# Patient Record
Sex: FEMALE | Race: BLACK OR AFRICAN AMERICAN | NOT HISPANIC OR LATINO
[De-identification: names, ages, dates, MRNs, and addresses within clinical notes are randomized per-mention and may not be internally consistent; named-entity substitution may affect disease eponyms.]

---

## 2019-10-01 ENCOUNTER — APPOINTMENT (OUTPATIENT)
Dept: VASCULAR SURGERY | Facility: CLINIC | Age: 54
End: 2019-10-01

## 2019-10-08 ENCOUNTER — APPOINTMENT (OUTPATIENT)
Dept: VASCULAR SURGERY | Facility: CLINIC | Age: 54
End: 2019-10-08

## 2021-04-05 ENCOUNTER — TRANSCRIPTION ENCOUNTER (OUTPATIENT)
Age: 56
End: 2021-04-05

## 2021-04-06 ENCOUNTER — TRANSCRIPTION ENCOUNTER (OUTPATIENT)
Age: 56
End: 2021-04-06

## 2021-12-08 ENCOUNTER — EMERGENCY (EMERGENCY)
Facility: HOSPITAL | Age: 56
LOS: 1 days | Discharge: HOME | End: 2021-12-08
Attending: EMERGENCY MEDICINE
Payer: COMMERCIAL

## 2021-12-08 VITALS
DIASTOLIC BLOOD PRESSURE: 90 MMHG | WEIGHT: 255.07 LBS | RESPIRATION RATE: 18 BRPM | OXYGEN SATURATION: 97 % | SYSTOLIC BLOOD PRESSURE: 140 MMHG | TEMPERATURE: 98 F | HEIGHT: 66 IN | HEART RATE: 69 BPM

## 2021-12-08 DIAGNOSIS — Z98.89 OTHER SPECIFIED POSTPROCEDURAL STATES: Chronic | ICD-10-CM

## 2021-12-08 DIAGNOSIS — L73.2 HIDRADENITIS SUPPURATIVA: Chronic | ICD-10-CM

## 2021-12-08 DIAGNOSIS — M22.90 UNSPECIFIED DISORDER OF PATELLA, UNSPECIFIED KNEE: Chronic | ICD-10-CM

## 2021-12-08 DIAGNOSIS — Z98.84 BARIATRIC SURGERY STATUS: Chronic | ICD-10-CM

## 2021-12-08 LAB
ALBUMIN SERPL ELPH-MCNC: 4.6 G/DL — SIGNIFICANT CHANGE UP (ref 3.3–5)
ALP SERPL-CCNC: 80 U/L — SIGNIFICANT CHANGE UP (ref 40–120)
ALT FLD-CCNC: 20 U/L — SIGNIFICANT CHANGE UP (ref 10–45)
ANION GAP SERPL CALC-SCNC: 13 MMOL/L — SIGNIFICANT CHANGE UP (ref 5–17)
AST SERPL-CCNC: 20 U/L — SIGNIFICANT CHANGE UP (ref 10–40)
BASOPHILS # BLD AUTO: 0.02 K/UL — SIGNIFICANT CHANGE UP (ref 0–0.2)
BASOPHILS NFR BLD AUTO: 0.2 % — SIGNIFICANT CHANGE UP (ref 0–2)
BILIRUB SERPL-MCNC: 0.2 MG/DL — SIGNIFICANT CHANGE UP (ref 0.2–1.2)
BUN SERPL-MCNC: 17 MG/DL — SIGNIFICANT CHANGE UP (ref 7–23)
CALCIUM SERPL-MCNC: 9.8 MG/DL — SIGNIFICANT CHANGE UP (ref 8.4–10.5)
CHLORIDE SERPL-SCNC: 104 MMOL/L — SIGNIFICANT CHANGE UP (ref 96–108)
CO2 SERPL-SCNC: 24 MMOL/L — SIGNIFICANT CHANGE UP (ref 22–31)
CREAT SERPL-MCNC: 1.04 MG/DL — SIGNIFICANT CHANGE UP (ref 0.5–1.3)
EOSINOPHIL # BLD AUTO: 0.02 K/UL — SIGNIFICANT CHANGE UP (ref 0–0.5)
EOSINOPHIL NFR BLD AUTO: 0.2 % — SIGNIFICANT CHANGE UP (ref 0–6)
GLUCOSE SERPL-MCNC: 130 MG/DL — HIGH (ref 70–99)
HCT VFR BLD CALC: 38.7 % — SIGNIFICANT CHANGE UP (ref 34.5–45)
HGB BLD-MCNC: 12 G/DL — SIGNIFICANT CHANGE UP (ref 11.5–15.5)
IMM GRANULOCYTES NFR BLD AUTO: 0.3 % — SIGNIFICANT CHANGE UP (ref 0–1.5)
LYMPHOCYTES # BLD AUTO: 1.44 K/UL — SIGNIFICANT CHANGE UP (ref 1–3.3)
LYMPHOCYTES # BLD AUTO: 12.5 % — LOW (ref 13–44)
MCHC RBC-ENTMCNC: 25.4 PG — LOW (ref 27–34)
MCHC RBC-ENTMCNC: 31 GM/DL — LOW (ref 32–36)
MCV RBC AUTO: 82 FL — SIGNIFICANT CHANGE UP (ref 80–100)
MONOCYTES # BLD AUTO: 0.46 K/UL — SIGNIFICANT CHANGE UP (ref 0–0.9)
MONOCYTES NFR BLD AUTO: 4 % — SIGNIFICANT CHANGE UP (ref 2–14)
NEUTROPHILS # BLD AUTO: 9.53 K/UL — HIGH (ref 1.8–7.4)
NEUTROPHILS NFR BLD AUTO: 82.8 % — HIGH (ref 43–77)
NRBC # BLD: 0 /100 WBCS — SIGNIFICANT CHANGE UP (ref 0–0)
PLATELET # BLD AUTO: 290 K/UL — SIGNIFICANT CHANGE UP (ref 150–400)
POTASSIUM SERPL-MCNC: 3.5 MMOL/L — SIGNIFICANT CHANGE UP (ref 3.5–5.3)
POTASSIUM SERPL-SCNC: 3.5 MMOL/L — SIGNIFICANT CHANGE UP (ref 3.5–5.3)
PROT SERPL-MCNC: 8.4 G/DL — HIGH (ref 6–8.3)
RBC # BLD: 4.72 M/UL — SIGNIFICANT CHANGE UP (ref 3.8–5.2)
RBC # FLD: 16.7 % — HIGH (ref 10.3–14.5)
SODIUM SERPL-SCNC: 141 MMOL/L — SIGNIFICANT CHANGE UP (ref 135–145)
TROPONIN T, HIGH SENSITIVITY RESULT: 8 NG/L — SIGNIFICANT CHANGE UP (ref 0–51)
WBC # BLD: 11.51 K/UL — HIGH (ref 3.8–10.5)
WBC # FLD AUTO: 11.51 K/UL — HIGH (ref 3.8–10.5)

## 2021-12-08 NOTE — ED PROVIDER NOTE - PATIENT PORTAL LINK FT
You can access the FollowMyHealth Patient Portal offered by [facility] by registering at the following website: [email or web address]. By joining [facility]’s FollowMyHealth portal, you will also be able to view your health information using other applications (apps) compatible with our system.

## 2021-12-08 NOTE — ED PROVIDER NOTE - OBJECTIVE STATEMENT
55yo F h.o HTN/HLD, Afib (xeralto, metoprolol) presenting to ED bc of syncope. Pt reports yesterday afternoon, was visiting family at doctors office, seated when she began to feel dizzy with palpations and flushing. Pt reports she "blacked out" for a few seconds and after waking up continued to feel dizzy with palpitations. Palpitations resovled after several hours, initially presented to [facility] however left bc dissatisfied with care. At [facility] EKG demonstrated normal sinus rhythm. Denies any chest pain or SOB, no smoking/drinking/tox ingestion. Denies LE swelling or long periods of stasis. ROS otherwise negative. Last saw PCP this morning prior to symptoms, has been relatively hypotensive and was given reduced script of amlodipine.

## 2021-12-08 NOTE — ED PROVIDER NOTE - PHYSICAL EXAMINATION
GENERAL: non-toxic appearing, alert, in NAD  HEENT: atraumatic, normocephalic, Vision grossly intact, no conjunctivitis or discharge, hearing grossly intact,  no nasal discharge, epistaxis   CARDIAC: RRR, normal S1S2,  no appreciable murmurs, no cyanosis, cap refill < 2 seconds  PULM: normal work of breathing, oxygen saturation on RA wnl, CTAB, no crackles, rales, rhonchi, or wheezing  GI: abdomen nondistended, soft, nontender, no guarding or rebound tenderness, no palpable masses  NEURO: awake and alert, follows commands, normal speech, PERRLA, EOMI, no focal motor or sensory deficits  MSK: spine appears normal, no joint swelling or erythema, ranging all extremities with no appreciable loss of ROM  EXT: no peripheral edema, calf tenderness, redness or swelling  SKIN: warm, dry, and intact, no rashes  PSYCH: appropriate mood and affect

## 2021-12-08 NOTE — ED PROVIDER NOTE - NSFOLLOWUPINSTRUCTIONS_ED_ALL_ED_FT
You were seen and evaluated today for palpitations likely caused by caused by _____. Our work-up today included ____, the results of which have been explained to you and provided to you separately. Please keep a copy of these results to present to doctor in future visits.     - Follow up with your primary care physician within 7-10 days  - You were prescribed ____. Please take ___ as directed on bottle.  - Be sure to take all of your normal medications as prescribed  - Expect a call within the next 2-3 business days to schedule an appointment with ___  - If you do not recieve a call, please call the number provided to you in this packet to schedule an appointment within one week from today    Please return to the Emergency Department should you experience any of the following:  - New or worsening chest pain  - Shortness of breath, Palpitations  - New weakness, fatigue, or fainting  - Fever, unexplained weightloss, night sweats  - Blood in stool or in urine You were seen and evaluated today for palpitations. Our work-up today included blood work, urine studies, and EKG, the results of which have been explained to you and provided to you separately. Please keep a copy of these results to present to doctor in future visits.     - Follow up with your primary care physician within 7-10 days  - Be sure to take all of your normal medications as prescribed  - Follow up with your cardiologist as discussed, ask about a halter monitor to evaluate for paroxysmal atrial fibrillation    Please return to the Emergency Department should you experience any of the following:  - New or worsening chest pain  - Shortness of breath, Palpitations  - New weakness, fatigue, or fainting  - Fever, unexplained weight loss, night sweats  - Blood in stool or in urine

## 2021-12-08 NOTE — ED PROVIDER NOTE - PROGRESS NOTE DETAILS
Mik Barr, PGY-2: Pt reexamined at bedside, resting comfortably, vitals stable. No events on tele monitoring. Lytes wnl, UA negative.

## 2021-12-08 NOTE — ED PROVIDER NOTE - ATTENDING CONTRIBUTION TO CARE
Pt w hx parox afib, here with palpitations and questionable syncopal episode (for a few seconds). pt initially asymptomatic, while waiting in ED began feeling palpitations again. very well appearing on exam, irreg rhythm w normal rate, well perfused, normal breathing, no peripheral edema. Workup done to rule out acute infectious, metabolic, cardiac condition - unremarkable for acute process. ekg shows NSR with intermittent PVCs. Multiple diagnoses were considered during patient's evaluation today. While exact etiology of symptoms is unclear, there appears to be no emergent process today that would require further emergent or inpatient management. Pt is safe for dc with outpt f/u w cards for further eval and possible holter and return instructions if symptoms worsen.

## 2021-12-08 NOTE — ED PROVIDER NOTE - CLINICAL SUMMARY MEDICAL DECISION MAKING FREE TEXT BOX
57yo F h.o afib with syncope and palpitations, currently in sinus with no lasting symptoms, vitals stable, no murmur, lower extremity swelling. Clear lungs. Concern for paroxysmal afib vs vasovagal. Do not suspect ACS given non-ischemic EKG, no chest pain. Will get labs, tele monitor and reassess.

## 2021-12-08 NOTE — ED PROVIDER NOTE - RAPID ASSESSMENT
56y F with pmhx of Afib on Xarelto p/w palpitations, dizziness, and diaphoresis at 4pm. Pt states Sx began while she was waiting for sister at doctors appointment. EMS called and pt found to be in Afib and brought to [facility]. However, there was a 12hr wait to be seen so pt left to come here. Cards Dr. Holley and Aidan.     Patient was seen as a tele QDOC patient. The patient will be seen and further worked up in the main emergency department and their care will be completed by the main emergency department team along with a thorough physical exam. Receiving team will follow up on labs, analgesia, any clinical imaging, reassess and disposition as clinically indicated, all decisions regarding the progression of care will be made at their discretion.    Scribe Statement: I, Aileen Ni, attest that this documentation has been prepared under the direction and in the presence of Brenda Harp) 56y F with pmhx of Afib on Xarelto p/w palpitations, dizziness, and diaphoresis at 4pm. Pt states Sx began while she was waiting for sister at doctors appointment. EMS called and pt found to be in Afib and brought to [facility]. However, there was a 12hr wait to be seen so pt left to come here. States palpitations have mostly resolved but continues to feel off. Cards Dr. Holley and Aidan.     Patient was seen as a tele QDOC patient. The patient will be seen and further worked up in the main emergency department and their care will be completed by the main emergency department team along with a thorough physical exam. Receiving team will follow up on labs, analgesia, any clinical imaging, reassess and disposition as clinically indicated, all decisions regarding the progression of care will be made at their discretion.    Scribe Statement: Aileen GARCIAS, attest that this documentation has been prepared under the direction and in the presence of Brenda Harp) 56y F with pmhx of Afib on Xarelto p/w palpitations, dizziness, and diaphoresis at 4pm. Pt states Sx began while she was waiting for sister at doctors appointment. EMS called and pt found to be in Afib and brought to [facility]. However, there was a 12hr wait to be seen so pt left to come here. States palpitations have mostly resolved but continues to feel off. Cards Dr. Holley and Aiadn.     Patient was seen as a tele QDOC patient. The patient will be seen and further worked up in the main emergency department and their care will be completed by the main emergency department team along with a thorough physical exam. Receiving team will follow up on labs, analgesia, any clinical imaging, reassess and disposition as clinically indicated, all decisions regarding the progression of care will be made at their discretion.    Scribe Statement: I, Aileen iN, attest that this documentation has been prepared under the direction and in the presence of Deangelo Harp(PA)    Deangelo Harp PA-C: The scribe's documentation has been prepared under my direction and personally reviewed by me in its entirety. I confirm that the note above accurately reflects history obtained.

## 2021-12-09 VITALS
DIASTOLIC BLOOD PRESSURE: 59 MMHG | TEMPERATURE: 98 F | OXYGEN SATURATION: 97 % | RESPIRATION RATE: 18 BRPM | HEART RATE: 67 BPM | SYSTOLIC BLOOD PRESSURE: 103 MMHG

## 2021-12-09 LAB
APPEARANCE UR: CLEAR — SIGNIFICANT CHANGE UP
BACTERIA # UR AUTO: NEGATIVE — SIGNIFICANT CHANGE UP
BILIRUB UR-MCNC: NEGATIVE — SIGNIFICANT CHANGE UP
COLOR SPEC: YELLOW — SIGNIFICANT CHANGE UP
DIFF PNL FLD: NEGATIVE — SIGNIFICANT CHANGE UP
EPI CELLS # UR: 3 /HPF — SIGNIFICANT CHANGE UP
GLUCOSE UR QL: NEGATIVE — SIGNIFICANT CHANGE UP
HYALINE CASTS # UR AUTO: 3 /LPF — HIGH (ref 0–2)
KETONES UR-MCNC: NEGATIVE — SIGNIFICANT CHANGE UP
LEUKOCYTE ESTERASE UR-ACNC: NEGATIVE — SIGNIFICANT CHANGE UP
NITRITE UR-MCNC: NEGATIVE — SIGNIFICANT CHANGE UP
PH UR: 6 — SIGNIFICANT CHANGE UP (ref 5–8)
PROT UR-MCNC: 100 — SIGNIFICANT CHANGE UP
RBC CASTS # UR COMP ASSIST: 1 /HPF — SIGNIFICANT CHANGE UP (ref 0–4)
SP GR SPEC: 1.03 — HIGH (ref 1.01–1.02)
TROPONIN T, HIGH SENSITIVITY RESULT: 8 NG/L — SIGNIFICANT CHANGE UP (ref 0–51)
TSH SERPL-MCNC: 0.53 UIU/ML — SIGNIFICANT CHANGE UP (ref 0.27–4.2)
UROBILINOGEN FLD QL: ABNORMAL
WBC UR QL: 3 /HPF — SIGNIFICANT CHANGE UP (ref 0–5)

## 2021-12-09 PROCEDURE — 93005 ELECTROCARDIOGRAM TRACING: CPT

## 2021-12-09 PROCEDURE — 71045 X-RAY EXAM CHEST 1 VIEW: CPT

## 2021-12-09 PROCEDURE — 85025 COMPLETE CBC W/AUTO DIFF WBC: CPT

## 2021-12-09 PROCEDURE — 81001 URINALYSIS AUTO W/SCOPE: CPT

## 2021-12-09 PROCEDURE — 99284 EMERGENCY DEPT VISIT MOD MDM: CPT | Mod: 25

## 2021-12-09 PROCEDURE — 96374 THER/PROPH/DIAG INJ IV PUSH: CPT

## 2021-12-09 PROCEDURE — 83735 ASSAY OF MAGNESIUM: CPT

## 2021-12-09 PROCEDURE — 84443 ASSAY THYROID STIM HORMONE: CPT

## 2021-12-09 PROCEDURE — 84484 ASSAY OF TROPONIN QUANT: CPT

## 2021-12-09 PROCEDURE — 36415 COLL VENOUS BLD VENIPUNCTURE: CPT

## 2021-12-09 PROCEDURE — 80053 COMPREHEN METABOLIC PANEL: CPT

## 2021-12-09 RX ORDER — FAMOTIDINE 10 MG/ML
20 INJECTION INTRAVENOUS ONCE
Refills: 0 | Status: COMPLETED | OUTPATIENT
Start: 2021-12-09 | End: 2021-12-09

## 2021-12-09 RX ORDER — SIMETHICONE 80 MG/1
80 TABLET, CHEWABLE ORAL ONCE
Refills: 0 | Status: COMPLETED | OUTPATIENT
Start: 2021-12-09 | End: 2021-12-09

## 2021-12-09 RX ADMIN — SIMETHICONE 80 MILLIGRAM(S): 80 TABLET, CHEWABLE ORAL at 02:24

## 2021-12-09 RX ADMIN — FAMOTIDINE 20 MILLIGRAM(S): 10 INJECTION INTRAVENOUS at 02:24

## 2021-12-09 NOTE — ED ADULT NURSE NOTE - OBJECTIVE STATEMENT
Pt is a 57 y/o female c/o palpitations, dizziness, episodes of diaphoresis. Placed on cardiac monitor, c/o intermittent episodes of palpitations at rest in ED and prior to arrival. States "I have had palpitations in the past but never this bad". C/o gas/burping. Denies N/V/D, numbness, tingling, cough, fever, chills, dizziness, weakness, headache. A&Ox3. Breathing unlabored and spontaneous. Abdomen soft, nontender, nondistended. Full ROM and strength of extremities. Safety and comfort measures provided, call bell provided to pt and bed in lowest position.

## 2021-12-10 NOTE — ED POST DISCHARGE NOTE - RESULT SUMMARY
Radiology Discrepancy Reported On [place]: 12/10 CXR with limited view, recommending PA/lateral for more complete eval.

## 2021-12-29 ENCOUNTER — APPOINTMENT (OUTPATIENT)
Dept: UROLOGY | Facility: CLINIC | Age: 56
End: 2021-12-29

## 2022-01-06 ENCOUNTER — TRANSCRIPTION ENCOUNTER (OUTPATIENT)
Age: 57
End: 2022-01-06

## 2022-01-09 ENCOUNTER — TRANSCRIPTION ENCOUNTER (OUTPATIENT)
Age: 57
End: 2022-01-09

## 2023-07-03 ENCOUNTER — INPATIENT (INPATIENT)
Facility: HOSPITAL | Age: 58
LOS: 2 days | Discharge: HOME | DRG: 313 | End: 2023-07-06
Attending: GENERAL PRACTICE | Admitting: GENERAL PRACTICE
Payer: COMMERCIAL

## 2023-07-03 VITALS
SYSTOLIC BLOOD PRESSURE: 162 MMHG | WEIGHT: 263.89 LBS | OXYGEN SATURATION: 98 % | DIASTOLIC BLOOD PRESSURE: 93 MMHG | TEMPERATURE: 98 F | HEART RATE: 81 BPM | RESPIRATION RATE: 16 BRPM | HEIGHT: 66 IN

## 2023-07-03 DIAGNOSIS — Z98.89 OTHER SPECIFIED POSTPROCEDURAL STATES: Chronic | ICD-10-CM

## 2023-07-03 DIAGNOSIS — L73.2 HIDRADENITIS SUPPURATIVA: Chronic | ICD-10-CM

## 2023-07-03 DIAGNOSIS — Z98.84 BARIATRIC SURGERY STATUS: Chronic | ICD-10-CM

## 2023-07-03 DIAGNOSIS — M22.90 UNSPECIFIED DISORDER OF PATELLA, UNSPECIFIED KNEE: Chronic | ICD-10-CM

## 2023-07-03 PROCEDURE — 99285 EMERGENCY DEPT VISIT HI MDM: CPT

## 2023-07-04 DIAGNOSIS — I48.20 CHRONIC ATRIAL FIBRILLATION, UNSPECIFIED: ICD-10-CM

## 2023-07-04 DIAGNOSIS — I10 ESSENTIAL (PRIMARY) HYPERTENSION: ICD-10-CM

## 2023-07-04 DIAGNOSIS — R41.82 ALTERED MENTAL STATUS, UNSPECIFIED: ICD-10-CM

## 2023-07-04 DIAGNOSIS — R07.9 CHEST PAIN, UNSPECIFIED: ICD-10-CM

## 2023-07-04 DIAGNOSIS — R07.89 OTHER CHEST PAIN: ICD-10-CM

## 2023-07-04 PROBLEM — I48.91 UNSPECIFIED ATRIAL FIBRILLATION: Chronic | Status: ACTIVE | Noted: 2021-12-09

## 2023-07-04 LAB
ALBUMIN SERPL ELPH-MCNC: 4.6 G/DL — SIGNIFICANT CHANGE UP (ref 3.3–5)
ALP SERPL-CCNC: 91 U/L — SIGNIFICANT CHANGE UP (ref 40–120)
ALT FLD-CCNC: 20 U/L — SIGNIFICANT CHANGE UP (ref 10–45)
ANION GAP SERPL CALC-SCNC: 11 MMOL/L — SIGNIFICANT CHANGE UP (ref 5–17)
AST SERPL-CCNC: 24 U/L — SIGNIFICANT CHANGE UP (ref 10–40)
BASE EXCESS BLDV CALC-SCNC: 4 MMOL/L — HIGH (ref -2–3)
BASOPHILS # BLD AUTO: 0.02 K/UL — SIGNIFICANT CHANGE UP (ref 0–0.2)
BASOPHILS NFR BLD AUTO: 0.2 % — SIGNIFICANT CHANGE UP (ref 0–2)
BILIRUB SERPL-MCNC: 0.2 MG/DL — SIGNIFICANT CHANGE UP (ref 0.2–1.2)
BUN SERPL-MCNC: 16 MG/DL — SIGNIFICANT CHANGE UP (ref 7–23)
CA-I SERPL-SCNC: 1.18 MMOL/L — SIGNIFICANT CHANGE UP (ref 1.15–1.33)
CALCIUM SERPL-MCNC: 10 MG/DL — SIGNIFICANT CHANGE UP (ref 8.4–10.5)
CHLORIDE BLDV-SCNC: 103 MMOL/L — SIGNIFICANT CHANGE UP (ref 96–108)
CHLORIDE SERPL-SCNC: 100 MMOL/L — SIGNIFICANT CHANGE UP (ref 96–108)
CK MB BLD-MCNC: 1.7 % — SIGNIFICANT CHANGE UP (ref 0–3.5)
CK MB CFR SERPL CALC: 2 NG/ML — SIGNIFICANT CHANGE UP (ref 0–3.8)
CK SERPL-CCNC: 117 U/L — SIGNIFICANT CHANGE UP (ref 25–170)
CO2 BLDV-SCNC: 33 MMOL/L — HIGH (ref 22–26)
CO2 SERPL-SCNC: 28 MMOL/L — SIGNIFICANT CHANGE UP (ref 22–31)
CREAT SERPL-MCNC: 0.87 MG/DL — SIGNIFICANT CHANGE UP (ref 0.5–1.3)
EGFR: 78 ML/MIN/1.73M2 — SIGNIFICANT CHANGE UP
EOSINOPHIL # BLD AUTO: 0.03 K/UL — SIGNIFICANT CHANGE UP (ref 0–0.5)
EOSINOPHIL NFR BLD AUTO: 0.3 % — SIGNIFICANT CHANGE UP (ref 0–6)
GAS PNL BLDV: 136 MMOL/L — SIGNIFICANT CHANGE UP (ref 136–145)
GAS PNL BLDV: SIGNIFICANT CHANGE UP
GAS PNL BLDV: SIGNIFICANT CHANGE UP
GLUCOSE BLDV-MCNC: 64 MG/DL — LOW (ref 70–99)
GLUCOSE SERPL-MCNC: 70 MG/DL — SIGNIFICANT CHANGE UP (ref 70–99)
HCO3 BLDV-SCNC: 31 MMOL/L — HIGH (ref 22–29)
HCT VFR BLD CALC: 40.1 % — SIGNIFICANT CHANGE UP (ref 34.5–45)
HCT VFR BLDA CALC: 38 % — SIGNIFICANT CHANGE UP (ref 34.5–46.5)
HGB BLD CALC-MCNC: 12.6 G/DL — SIGNIFICANT CHANGE UP (ref 11.7–16.1)
HGB BLD-MCNC: 12.2 G/DL — SIGNIFICANT CHANGE UP (ref 11.5–15.5)
HOROWITZ INDEX BLDV+IHG-RTO: SIGNIFICANT CHANGE UP
IMM GRANULOCYTES NFR BLD AUTO: 0.3 % — SIGNIFICANT CHANGE UP (ref 0–0.9)
LACTATE BLDV-MCNC: 1.6 MMOL/L — SIGNIFICANT CHANGE UP (ref 0.5–2)
LYMPHOCYTES # BLD AUTO: 1.6 K/UL — SIGNIFICANT CHANGE UP (ref 1–3.3)
LYMPHOCYTES # BLD AUTO: 17.8 % — SIGNIFICANT CHANGE UP (ref 13–44)
MAGNESIUM SERPL-MCNC: 2.1 MG/DL — SIGNIFICANT CHANGE UP (ref 1.6–2.6)
MCHC RBC-ENTMCNC: 24.8 PG — LOW (ref 27–34)
MCHC RBC-ENTMCNC: 30.4 GM/DL — LOW (ref 32–36)
MCV RBC AUTO: 81.7 FL — SIGNIFICANT CHANGE UP (ref 80–100)
MONOCYTES # BLD AUTO: 0.66 K/UL — SIGNIFICANT CHANGE UP (ref 0–0.9)
MONOCYTES NFR BLD AUTO: 7.3 % — SIGNIFICANT CHANGE UP (ref 2–14)
NEUTROPHILS # BLD AUTO: 6.64 K/UL — SIGNIFICANT CHANGE UP (ref 1.8–7.4)
NEUTROPHILS NFR BLD AUTO: 74.1 % — SIGNIFICANT CHANGE UP (ref 43–77)
NRBC # BLD: 0 /100 WBCS — SIGNIFICANT CHANGE UP (ref 0–0)
PCO2 BLDV: 56 MMHG — HIGH (ref 39–42)
PH BLDV: 7.35 — SIGNIFICANT CHANGE UP (ref 7.32–7.43)
PLATELET # BLD AUTO: 304 K/UL — SIGNIFICANT CHANGE UP (ref 150–400)
PO2 BLDV: 26 MMHG — SIGNIFICANT CHANGE UP (ref 25–45)
POTASSIUM BLDV-SCNC: 5.4 MMOL/L — HIGH (ref 3.5–5.1)
POTASSIUM SERPL-MCNC: 3.9 MMOL/L — SIGNIFICANT CHANGE UP (ref 3.5–5.3)
POTASSIUM SERPL-SCNC: 3.9 MMOL/L — SIGNIFICANT CHANGE UP (ref 3.5–5.3)
PROT SERPL-MCNC: 8.5 G/DL — HIGH (ref 6–8.3)
RAPID RVP RESULT: SIGNIFICANT CHANGE UP
RBC # BLD: 4.91 M/UL — SIGNIFICANT CHANGE UP (ref 3.8–5.2)
RBC # FLD: 17.2 % — HIGH (ref 10.3–14.5)
SAO2 % BLDV: 37.4 % — LOW (ref 67–88)
SARS-COV-2 RNA SPEC QL NAA+PROBE: SIGNIFICANT CHANGE UP
SODIUM SERPL-SCNC: 139 MMOL/L — SIGNIFICANT CHANGE UP (ref 135–145)
TROPONIN T, HIGH SENSITIVITY RESULT: 7 NG/L — SIGNIFICANT CHANGE UP (ref 0–51)
TROPONIN T, HIGH SENSITIVITY RESULT: <6 NG/L — SIGNIFICANT CHANGE UP (ref 0–51)
WBC # BLD: 8.98 K/UL — SIGNIFICANT CHANGE UP (ref 3.8–10.5)
WBC # FLD AUTO: 8.98 K/UL — SIGNIFICANT CHANGE UP (ref 3.8–10.5)

## 2023-07-04 PROCEDURE — 71046 X-RAY EXAM CHEST 2 VIEWS: CPT | Mod: 26

## 2023-07-04 PROCEDURE — 70450 CT HEAD/BRAIN W/O DYE: CPT | Mod: 26,MA

## 2023-07-04 RX ORDER — METOPROLOL TARTRATE 50 MG
25 TABLET ORAL DAILY
Refills: 0 | Status: DISCONTINUED | OUTPATIENT
Start: 2023-07-04 | End: 2023-07-04

## 2023-07-04 RX ORDER — METOPROLOL TARTRATE 50 MG
25 TABLET ORAL
Refills: 0 | Status: DISCONTINUED | OUTPATIENT
Start: 2023-07-05 | End: 2023-07-06

## 2023-07-04 RX ORDER — FERROUS SULFATE 325(65) MG
0 TABLET ORAL
Refills: 0 | DISCHARGE

## 2023-07-04 RX ORDER — RIVAROXABAN 15 MG-20MG
1 KIT ORAL
Refills: 0 | DISCHARGE

## 2023-07-04 RX ORDER — ONDANSETRON 8 MG/1
4 TABLET, FILM COATED ORAL EVERY 8 HOURS
Refills: 0 | Status: DISCONTINUED | OUTPATIENT
Start: 2023-07-04 | End: 2023-07-06

## 2023-07-04 RX ORDER — RIVAROXABAN 15 MG-20MG
20 KIT ORAL
Refills: 0 | Status: DISCONTINUED | OUTPATIENT
Start: 2023-07-04 | End: 2023-07-05

## 2023-07-04 RX ORDER — ERGOCALCIFEROL 1.25 MG/1
0 CAPSULE ORAL
Refills: 0 | DISCHARGE

## 2023-07-04 RX ORDER — HYDROCHLOROTHIAZIDE 25 MG
1 TABLET ORAL
Refills: 0 | DISCHARGE

## 2023-07-04 RX ORDER — LANOLIN ALCOHOL/MO/W.PET/CERES
3 CREAM (GRAM) TOPICAL AT BEDTIME
Refills: 0 | Status: DISCONTINUED | OUTPATIENT
Start: 2023-07-04 | End: 2023-07-06

## 2023-07-04 RX ORDER — VALSARTAN 80 MG/1
1 TABLET ORAL
Refills: 0 | DISCHARGE

## 2023-07-04 RX ORDER — AMLODIPINE BESYLATE 2.5 MG/1
10 TABLET ORAL DAILY
Refills: 0 | Status: DISCONTINUED | OUTPATIENT
Start: 2023-07-04 | End: 2023-07-06

## 2023-07-04 RX ORDER — ACETAMINOPHEN 500 MG
650 TABLET ORAL EVERY 6 HOURS
Refills: 0 | Status: DISCONTINUED | OUTPATIENT
Start: 2023-07-04 | End: 2023-07-06

## 2023-07-04 RX ORDER — METOPROLOL TARTRATE 50 MG
25 TABLET ORAL
Refills: 0 | Status: DISCONTINUED | OUTPATIENT
Start: 2023-07-04 | End: 2023-07-04

## 2023-07-04 RX ORDER — ASPIRIN/CALCIUM CARB/MAGNESIUM 324 MG
324 TABLET ORAL ONCE
Refills: 0 | Status: COMPLETED | OUTPATIENT
Start: 2023-07-04 | End: 2023-07-04

## 2023-07-04 RX ORDER — VALSARTAN 80 MG/1
320 TABLET ORAL DAILY
Refills: 0 | Status: DISCONTINUED | OUTPATIENT
Start: 2023-07-04 | End: 2023-07-06

## 2023-07-04 RX ORDER — AMLODIPINE BESYLATE 2.5 MG/1
1 TABLET ORAL
Refills: 0 | DISCHARGE

## 2023-07-04 RX ADMIN — Medication 25 MILLIGRAM(S): at 16:45

## 2023-07-04 RX ADMIN — Medication 324 MILLIGRAM(S): at 09:23

## 2023-07-04 RX ADMIN — VALSARTAN 320 MILLIGRAM(S): 80 TABLET ORAL at 23:48

## 2023-07-04 RX ADMIN — RIVAROXABAN 20 MILLIGRAM(S): KIT at 16:45

## 2023-07-04 RX ADMIN — AMLODIPINE BESYLATE 10 MILLIGRAM(S): 2.5 TABLET ORAL at 22:22

## 2023-07-04 NOTE — ED PROVIDER NOTE - ATTENDING CONTRIBUTION TO CARE
Patient presents with chest discomfort intermittent several times in the last few months along with palpitations, and now also pressure behind her eyes.  Patient will require a cardiac work-up to rule out ACS, electrolyte abnormalities, less likely acute pulmonary condition.  Very low suspicion for dissection or PE or CVA.  Due to eye pressure and patient being on Xarelto, will obtain head CT to rule out ICH.  Patient has enough risk factors for vascular disease that she should stay even with a negative work-up for further cardiac evaluation as inpt.

## 2023-07-04 NOTE — H&P ADULT - HISTORY OF PRESENT ILLNESS
>>>>>>Medical Attending Initial / Admission note<<<<<<  -------------------------------------------------------------------------------  CHIEF COMPLAINT & HISTORY OF PRESENT ILLNESS:      Patient is a 59yo Female with past medical history of HTN, HLD, AF, presented to the ED with a chief complaint chest discomfort / pressure.    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 then developed seating, chest heaviness and tingling of left arm .. lasting about 5 minutes and resolved...      she had possibly some palpitations as well.. never passed out .. per patient no monitor was available with EMS upon arrival so unknown rhythm .    Patient states that similar  events have  happened to her  about 3 times in the past 3 months.  Patient states today the symptoms were different as and it was going down the left arm.    patient states had an echo last year with cardiologist and stress test in 2020  Patient denies nausea, vomiting, shortness of breath, headache, visual changes abdominal, urinary symptoms, diarrhea or constipation.  otherwise now comfortable in ER .. admitted for further workup    --------------------------------------------------------------------------------  PAST MEDICAL / SURGICAL  HISTORY:    HTN (hypertension)  HLD  AF  Carpal tunnel syndrome  Anemia  Obesity  Vitamin B12 deficiency  Lumbar disc disease  Ex-smoker  history of takasubu cardiomyopathy in 2014  venous insufficiency     S/P gastric surgery  gastric sleeve 2010 >> changed to bypass     S/P hernia repair  hiatal hernia 2011    S/P gastric bypass  2011    S/P carpal tunnel release, left  1996    S/P dilatation and curettage  11/2012    ovarian cyst removal     Hydradenitis  surgery, at age 22    Disorder of patella  hx of patella/quad repair 1999  bilateral knee replacements     --------------------------------------------------------------------------------  FAMILY HISTORY:    grandparents : cancer  both parents : Afib    --------------------------------------------------------------------------------  SOCIAL HISTORY:  Alcohol: socially  Smoking: past smoker, quit 17 yrs ago ( ?~ 2006)  works in [employer] / [employer]     --------------------------------------------------------------------------------  ALLERGIES:    [As sanju, reviewed]    --------------------------------------------------------------------------------  MEDICATIONS:   [As sanju, reviewed]    --------------------------------------------------------------------------------  REVIEW OF SYSTEM:    GEN: no fever, no chills, no pain now   RESP: no SOB, no cough, no sputum  CVS: no chest pain, no palpitations, no edema  GI: no abdominal pain, no nausea, no vomiting, no constipation, no diarrhea  : no dysuria, no frequency, no hematuria  Neuro: no headache, no dizziness  PSYCH: no anxiety, no depression  Derm : no itching, no rash    --------------------------------------------------------------------------------  VITAL SIGNS:  Height (cm): 167.6  Weight (kg): 119.7  BMI (kg/m2): 42.6  Vital Signs Last 24 HrsT(C): 36.8 (07-04-23 @ 12:13)  T(F): 98.3 (07-04-23 @ 12:13), Max: 98.3 (07-04-23 @ 07:35)  HR: 70 (07-04-23 @ 12:13) (64 - 81)  BP: 123/79 (07-04-23 @ 12:13)  RR: 16 (07-04-23 @ 12:13) (16 - 18)  SpO2: 96% (07-04-23 @ 12:13) (95% - 99%)      --------------------------------------------------------------------------------  PHYSICAL EXAM:    GEN: A&O X 3 , NAD , comfortable, pleasant, calm  HEENT: NCAT, PERRL, MMM, hearing intact  Neck: supple , no JVD  CVS: S1S2 , Irregular , No M/R/G appreciated  PULM: CTA B/L,  no W/R/R appreciated  ABD.: soft. non tender, non distended,  bowel sounds present  Extrem: intact pulses , no edema   Derm: No rash , no ecchymoses  PSYCH : normal mood,  no delusion not anxious    --------------------------------------------------------------------------------  LAB AND IMAGING:   [As sanju, reviewed]    --------------------------------------------------------------------------------  ASSESSMENT AND PLAN:   [As bellow]    --------------------  Case discussed with patient, cardiologist / EP made aware, to  follow   ___________________________  HMalcolm Hargrove D.O.  Pager: [phone]  07-04-23

## 2023-07-04 NOTE — H&P ADULT - PROBLEM SELECTOR PLAN 4
episode of " brain fog" as described, transient  likely related to above ( suspect arrythmia / pause/ VT...?  )  tele monitoring  CT head noted as above without major issues..   patient back to normal shortly thereafter..   monitor for now  supportive care

## 2023-07-04 NOTE — H&P ADULT - ASSESSMENT
Patient is a 59yo Female with past medical history of HTN, HLD, AF, presented to the ED with a chief complaint chest discomfort / pressure.    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 then developed seating, chest heaviness and tingling of left arm .. lasting about 5 minutes and resolved...      she had possibly some palpitations as well.. never passed out .. per patient no monitor was available with EMS upon arrival so unknown rhythm .    Patient states that similar  events have  happened to her  about 3 times in the past 3 months.  Patient states today the symptoms were different as and it was going down the left arm.    patient states had an echo last year with cardiologist and stress test in 2020  Patient denies nausea, vomiting, shortness of breath, headache, visual changes abdominal, urinary symptoms, diarrhea or constipation.  otherwise now comfortable in ER .. admitted for further workup

## 2023-07-04 NOTE — ED ADULT NURSE NOTE - NSFALLUNIVINTERV_ED_ALL_ED
Bed/Stretcher in lowest position, wheels locked, appropriate side rails in place/Call bell, personal items and telephone in reach/Instruct patient to call for assistance before getting out of bed/chair/stretcher/Non-slip footwear applied when patient is off stretcher/[place] to call system/Physically safe environment - no spills, clutter or unnecessary equipment/Purposeful proactive rounding/Room/bathroom lighting operational, light cord in reach

## 2023-07-04 NOTE — ED PROVIDER NOTE - NSICDXPASTSURGICALHX_GEN_ALL_CORE_FT
PAST SURGICAL HISTORY:  Disorder of patella hx of patella/quad repair 1999    Hydradenitis surgery, at age 22    S/P carpal tunnel release 1996    S/P dilatation and curettage 11/2012    S/P gastric bypass 2011    S/P gastric surgery gastric sleeve 2010    S/P hernia repair hiatal hernia 2011

## 2023-07-04 NOTE — ED ADULT NURSE NOTE - OBJECTIVE STATEMENT
Pt is a 58y female who presents to [facility] ED c/o of tunnel vision episodes. Pt endorses for the last few months she has been having episodes where she feels "disoriented like I am having tunnel vision", states tonight she had her 4th episode when she was at work and she began to zone out, states she feels a "heaviness and cant walk during the episode, along with chest heaviness/pressure along with tingling down the L arm and for the first time she began to profusely sweat" so her coworkers called EMS to bring her to the ER. Pt endorses she went to the ER for a similar episode in the past and was told she was in Afib but the episodes have increased in frequency. Pt currently endorses a slight HA with pressure along the eye area, placed on CM. PMH of Afib (On Xarelto), Cardiomyopathy, and HTN. Pt is A&Ox4 currently denying any visual deficits, SOB, cough, palpitations, abd pain, urinary symptoms, n/v/d/c, fever/chills, weakness/fatigue. Pt ambulates independently at baseline.

## 2023-07-04 NOTE — ED PROVIDER NOTE - NSICDXPASTMEDICALHX_GEN_ALL_CORE_FT
PAST MEDICAL HISTORY:  Anemia     Atrial fibrillation     Carpal tunnel syndrome     Ex-smoker     HTN (hypertension)     Lumbar disc disease     Obesity     Vitamin B12 deficiency

## 2023-07-04 NOTE — ED PROVIDER NOTE - PHYSICAL EXAMINATION
GENERAL: Awake, alert, NAD  HEENT: NC/AT, moist mucous membranes, PERRL, EOMI  LUNGS: CTAB, no wheezes or crackles   CARDIAC: irregular rhythm, no m/r/g  ABDOMEN: Soft, non tender, non distended, no rebound, no guarding  BACK: No midline spinal tenderness, no CVA tenderness  EXT: No edema, no calf tenderness, 2+ DP pulses bilaterally, no deformities.  NEURO: A&Ox3. Moving all extremities.  SKIN: Warm and dry. No rash.  PSYCH: Normal affect.

## 2023-07-04 NOTE — ED PROVIDER NOTE - OBJECTIVE STATEMENT
58-year-old female history of HTN, HLD, A-fib on Xarelto, cardiomyopathy presenting with a chief complaint chest discomfort.  Patient states that it does not feel like it pain but more like adiscomfort.  Patient is also endorsing palpitations.  Patient states that this is happened to her 3 times in the past 3 months.  Patient states symptoms last for about 20 minutes and then goes away.  Patient states today the symptoms were different as and it was going down the left arm.  Patient states now symptoms are at a minimum and pressure but does feel like she has some pressure behind her eyes bilaterally.  Patient was not given anything for symptoms today.  Patient denies nausea, vomiting, shortness of breath, headache, visual changes abdominal, urinary symptoms, diarrhea or constipation.

## 2023-07-04 NOTE — H&P ADULT - PROBLEM SELECTOR PLAN 1
patient with episode of chest heaviness and radiation of tingling and discomfort to left arm  from the history appears to be more arrythmia related than ACS  patient admitted to telemetry  cardiac enzyme negative X1 >> repeat ordered stat  continue home medications for now  monitor vitals labs  cardiologist aware, will follow up for further management

## 2023-07-04 NOTE — ED PROVIDER NOTE - CLINICAL SUMMARY MEDICAL DECISION MAKING FREE TEXT BOX
Patient is not limited to ACS, paroxysmal arrhythmia, atrial fibrillation, PNA pericarditis electrolyte/hematological derangement.  Will obtain basic labs, cardiac labs, EKG, chest x-ray.  Dispo pending labs and imaging and reassessment.

## 2023-07-04 NOTE — ED PROVIDER NOTE - PROGRESS NOTE DETAILS
Tom Gates MD PGY2: Pt signed out to me. Labs unactionable, with history to follow up cth to eval for intracranial process. Pending imaging, likely to admit for further cardiac workup. Tom Gates MD PGY2: ct neg. CDU no beds. Discussed with pt admission for cardiac workup, amenable. To give aspirin, admit.

## 2023-07-04 NOTE — H&P ADULT - PROBLEM SELECTOR PLAN 2
as above   patient with occasional palpitations but episode this time was different as noted...   Continue home medications and monitor.  tele monitoring  monitor electrolytes  cardiologist on board  EP evaluation and follow up as needed per cardiologist team

## 2023-07-04 NOTE — H&P ADULT - CONVERSATION DETAILS
*** Advance directive /  goals of care discussion      I had a long discussion with patient about patient's overall diagnosis, expected prognosis, and potential complications.       Discussed treatment options, comfort care / hospice as appropriate, and all other potential options of care.         Discussed risks, benefits, and alternatives of treatment as well.          Opportunity given for and all questions answered.            Reviewed available advance directives as available > none >> patient will decide on and complete HCP forms later..      At this time patient to be  full code, with continuation of current medical therapy.    patient considering DNR but understands and is ok with full resuscitation for now..   > would discuss with patient further and fill a MOLST form later as patient still considering and not sure..      Goal is for pt to return > home and follow up as outpatient with current doctors      will continue to discuss GOC with pt and family and update plan as needed.     Patient's family have my contact information and will contact me with questions.     Additional time spent on Goals of care: 22 min.

## 2023-07-05 ENCOUNTER — TRANSCRIPTION ENCOUNTER (OUTPATIENT)
Age: 58
End: 2023-07-05

## 2023-07-05 LAB
A1C WITH ESTIMATED AVERAGE GLUCOSE RESULT: 5.8 % — HIGH (ref 4–5.6)
ALBUMIN SERPL ELPH-MCNC: 3.8 G/DL — SIGNIFICANT CHANGE UP (ref 3.3–5)
ALP SERPL-CCNC: 83 U/L — SIGNIFICANT CHANGE UP (ref 40–120)
ALT FLD-CCNC: 18 U/L — SIGNIFICANT CHANGE UP (ref 10–45)
ANION GAP SERPL CALC-SCNC: 14 MMOL/L — SIGNIFICANT CHANGE UP (ref 5–17)
AST SERPL-CCNC: 18 U/L — SIGNIFICANT CHANGE UP (ref 10–40)
BASOPHILS # BLD AUTO: 0.02 K/UL — SIGNIFICANT CHANGE UP (ref 0–0.2)
BASOPHILS NFR BLD AUTO: 0.3 % — SIGNIFICANT CHANGE UP (ref 0–2)
BILIRUB SERPL-MCNC: 0.2 MG/DL — SIGNIFICANT CHANGE UP (ref 0.2–1.2)
BUN SERPL-MCNC: 25 MG/DL — HIGH (ref 7–23)
CALCIUM SERPL-MCNC: 8.9 MG/DL — SIGNIFICANT CHANGE UP (ref 8.4–10.5)
CHLORIDE SERPL-SCNC: 104 MMOL/L — SIGNIFICANT CHANGE UP (ref 96–108)
CHOLEST SERPL-MCNC: 166 MG/DL — SIGNIFICANT CHANGE UP
CO2 SERPL-SCNC: 23 MMOL/L — SIGNIFICANT CHANGE UP (ref 22–31)
CREAT SERPL-MCNC: 1.23 MG/DL — SIGNIFICANT CHANGE UP (ref 0.5–1.3)
EGFR: 52 ML/MIN/1.73M2 — LOW
EOSINOPHIL # BLD AUTO: 0.11 K/UL — SIGNIFICANT CHANGE UP (ref 0–0.5)
EOSINOPHIL NFR BLD AUTO: 1.7 % — SIGNIFICANT CHANGE UP (ref 0–6)
ESTIMATED AVERAGE GLUCOSE: 120 MG/DL — HIGH (ref 68–114)
GLUCOSE SERPL-MCNC: 80 MG/DL — SIGNIFICANT CHANGE UP (ref 70–99)
HCT VFR BLD CALC: 37.1 % — SIGNIFICANT CHANGE UP (ref 34.5–45)
HDLC SERPL-MCNC: 63 MG/DL — SIGNIFICANT CHANGE UP
HGB BLD-MCNC: 11.3 G/DL — LOW (ref 11.5–15.5)
IMM GRANULOCYTES NFR BLD AUTO: 0.5 % — SIGNIFICANT CHANGE UP (ref 0–0.9)
LIPID PNL WITH DIRECT LDL SERPL: 84 MG/DL — SIGNIFICANT CHANGE UP
LYMPHOCYTES # BLD AUTO: 2.03 K/UL — SIGNIFICANT CHANGE UP (ref 1–3.3)
LYMPHOCYTES # BLD AUTO: 31.1 % — SIGNIFICANT CHANGE UP (ref 13–44)
MCHC RBC-ENTMCNC: 25.1 PG — LOW (ref 27–34)
MCHC RBC-ENTMCNC: 30.5 GM/DL — LOW (ref 32–36)
MCV RBC AUTO: 82.3 FL — SIGNIFICANT CHANGE UP (ref 80–100)
MONOCYTES # BLD AUTO: 0.65 K/UL — SIGNIFICANT CHANGE UP (ref 0–0.9)
MONOCYTES NFR BLD AUTO: 10 % — SIGNIFICANT CHANGE UP (ref 2–14)
NEUTROPHILS # BLD AUTO: 3.68 K/UL — SIGNIFICANT CHANGE UP (ref 1.8–7.4)
NEUTROPHILS NFR BLD AUTO: 56.4 % — SIGNIFICANT CHANGE UP (ref 43–77)
NON HDL CHOLESTEROL: 103 MG/DL — SIGNIFICANT CHANGE UP
NRBC # BLD: 0 /100 WBCS — SIGNIFICANT CHANGE UP (ref 0–0)
PLATELET # BLD AUTO: 271 K/UL — SIGNIFICANT CHANGE UP (ref 150–400)
POTASSIUM SERPL-MCNC: 3.9 MMOL/L — SIGNIFICANT CHANGE UP (ref 3.5–5.3)
POTASSIUM SERPL-SCNC: 3.9 MMOL/L — SIGNIFICANT CHANGE UP (ref 3.5–5.3)
PROT SERPL-MCNC: 7.3 G/DL — SIGNIFICANT CHANGE UP (ref 6–8.3)
RBC # BLD: 4.51 M/UL — SIGNIFICANT CHANGE UP (ref 3.8–5.2)
RBC # FLD: 17.1 % — HIGH (ref 10.3–14.5)
SODIUM SERPL-SCNC: 141 MMOL/L — SIGNIFICANT CHANGE UP (ref 135–145)
TRIGL SERPL-MCNC: 98 MG/DL — SIGNIFICANT CHANGE UP
TSH SERPL-MCNC: 0.48 UIU/ML — SIGNIFICANT CHANGE UP (ref 0.27–4.2)
WBC # BLD: 6.52 K/UL — SIGNIFICANT CHANGE UP (ref 3.8–10.5)
WBC # FLD AUTO: 6.52 K/UL — SIGNIFICANT CHANGE UP (ref 3.8–10.5)

## 2023-07-05 PROCEDURE — 75574 CT ANGIO HRT W/3D IMAGE: CPT | Mod: 26

## 2023-07-05 PROCEDURE — 93306 TTE W/DOPPLER COMPLETE: CPT | Mod: 26

## 2023-07-05 RX ORDER — RIVAROXABAN 15 MG-20MG
20 KIT ORAL
Refills: 0 | Status: DISCONTINUED | OUTPATIENT
Start: 2023-07-05 | End: 2023-07-06

## 2023-07-05 RX ORDER — SODIUM CHLORIDE 9 MG/ML
1000 INJECTION INTRAMUSCULAR; INTRAVENOUS; SUBCUTANEOUS
Refills: 0 | Status: DISCONTINUED | OUTPATIENT
Start: 2023-07-05 | End: 2023-07-06

## 2023-07-05 RX ORDER — ENOXAPARIN SODIUM 100 MG/ML
120 INJECTION SUBCUTANEOUS EVERY 12 HOURS
Refills: 0 | Status: DISCONTINUED | OUTPATIENT
Start: 2023-07-05 | End: 2023-07-05

## 2023-07-05 RX ADMIN — SODIUM CHLORIDE 70 MILLILITER(S): 9 INJECTION INTRAMUSCULAR; INTRAVENOUS; SUBCUTANEOUS at 12:27

## 2023-07-05 RX ADMIN — Medication 25 MILLIGRAM(S): at 18:52

## 2023-07-05 RX ADMIN — Medication 25 MILLIGRAM(S): at 05:21

## 2023-07-05 RX ADMIN — RIVAROXABAN 20 MILLIGRAM(S): KIT at 23:31

## 2023-07-05 RX ADMIN — AMLODIPINE BESYLATE 10 MILLIGRAM(S): 2.5 TABLET ORAL at 05:21

## 2023-07-05 RX ADMIN — VALSARTAN 320 MILLIGRAM(S): 80 TABLET ORAL at 23:30

## 2023-07-05 NOTE — DISCHARGE NOTE PROVIDER - HOSPITAL COURSE
*****INCOMPLETE*******      Patient is a 57yo Female with past medical history of HTN, HLD, PAF, presented to the ED with a chief complaint chest discomfort / pressure.    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 then developed seating, chest heaviness and tingling of left arm .. lasting about 5 minutes and resolved...  she had possibly some palpitations as well.. never passed out.  Patient states that similar  events have  happened to her  about 3 times in the past 3 months.  Patient states today the symptoms were different as and it was going down the left arm.  patient states had an echo last year with cardiologist and stress test in 2020       Problem/Plan - 1:  ·  Problem: Chest discomfort   ·  Plan: patient with episode of chest heaviness and radiation of tingling and discomfort to left arm  from the history appears to be more arrythmia related than ACS  patient admitted to telemetry  cardiac enzyme negative X2 : ruled out for ACS   CTA Coronaries : Tortuous coronary arteries.  Rest of the visualized epicardial coronary arteries have no plaque  ECHO 7/05:  Normal left ventricular cavity size. The left ventricular systolic function is normal. There are no regional wall motion abnormalities seen.  cardiologist and EP appreciated   continue home medications for now       Problem/Plan - 2:  ·  Problem: Chronic atrial fibrillation.   ·  Plan: as above   patient with occasional palpitations but episode this time was different as noted...   Continue home medications : Metoprolol  and Xarelto   cardiologist and EP on board        Problem/Plan - 3:  ·  Problem: Hypertension.   ·  Plan: Continue home medications and monitor. Diovan, Norvasc, HCTZ,   adjust as needed per cardiologist      Problem/Plan - 4:  ·  Problem: AMS (altered mental status).   ·  Plan: episode of " brain fog" as described, transient  likely related to above ( suspect arrythmia / pause/ VT...?  )  tele monitoring  CT head noted as above without major issues..   patient back to normal shortly thereafter..   monitor for now  supportive care.          Patient is a 59yo Female with past medical history of HTN, HLD, PAF, presented to the ED with a chief complaint chest discomfort / pressure.    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 then developed seating, chest heaviness and tingling of left arm .. lasting about 5 minutes and resolved...  she had possibly some palpitations as well.. never passed out.  Patient states that similar  events have  happened to her  about 3 times in the past 3 months.  Patient states today the symptoms were different as and it was going down the left arm.  patient states had an echo last year with cardiologist and stress test in 2020      >  Chest discomfort   ·  Plan: patient with episode of chest heaviness and radiation of tingling and discomfort to left arm  from the history appears to be more arrythmia related than ACS  patient admitted to telemetry  cardiac enzyme negative X2 : ruled out for ACS   CTA Coronaries : Tortuous coronary arteries.  Rest of the visualized epicardial coronary arteries have no plaque  ECHO 7/05:  Normal left ventricular cavity size. The left ventricular systolic function is normal. There are no regional wall motion abnormalities seen.  cardiologist and EP appreciated   continue home medications for now    >  Chronic atrial fibrillation.   ·  Plan: as above   patient with occasional palpitations but episode this time was different as noted...   Continue home medications : Metoprolol  and Xarelto   cardiologist and EP on board     > Hypertension.   ·  Plan: Continue home medications and monitor. Diovan, Norvasc, HCTZ,   adjust as needed per cardiologist     >  AMS (altered mental status).   ·  Plan: episode of " brain fog" as described, transient  likely related to above ( suspect arrythmia / pause/ VT...?  )  tele monitoring  CT head noted as above without major issues..   patient back to normal shortly thereafter..   monitor for now            Patient is a 59yo Female with past medical history of HTN, HLD, PAF, presented to the ED with a chief complaint chest discomfort / pressure.    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 then developed seating, chest heaviness and tingling of left arm .. lasting about 5 minutes and resolved.  She had some palpitations as well butnever passed out.  Patient states that similar  events have  happened to her  about 3 times in the past 3 months.  Patient states today the symptoms were different as and it was going down the left arm.  patient states had an echo last year with cardiologist and stress test in 2020      Chest discomfort   -patient with episode of chest heaviness and radiation of tingling and discomfort to left arm  from the history appears to be more arrythmia related than ACS  patient admitted to telemetry  cardiac enzyme negative X2 : ruled out for ACS   CTA Coronaries : Tortuous coronary arteries.  Rest of the visualized epicardial coronary arteries have no plaque  ECHO 7/05:  Normal left ventricular cavity size. The left ventricular systolic function is normal. There are no regional wall motion abnormalities seen.  cardiologist and EP appreciated   As per cardiology Toprol xl increased to 50mg daily.    Chronic atrial fibrillation.   -as above   patient with occasional palpitations but episode this time was different as noted...   Continue home medications : Metoprolol  and Xarelto   cardiologist and EP on board      Hypertension.   ·  Plan: Continue home medications and monitor. Diovan, Norvasc, HCTZ,   adjust as needed per cardiologist     >  AMS (altered mental status).   ·  Plan: episode of " brain fog" as described, transient  likely related to above ( suspect arrythmia / pause/ VT...?  )  tele monitoring  CT head noted as above without major issues..   patient back to normal shortly thereafter..   monitor for now            Patient is a 59yo Female with past medical history of HTN, HLD, PAF, presented to the ED with a chief complaint chest discomfort / pressure.    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 then developed seating, chest heaviness and tingling of left arm .. lasting about 5 minutes and resolved.  She had some palpitations as well butnever passed out.  Patient states that similar  events have  happened to her  about 3 times in the past 3 months.  Patient states today the symptoms were different as and it was going down the left arm.  patient states had an echo last year with cardiologist and stress test in 2020      Chest discomfort   -patient with episode of chest heaviness and radiation of tingling and discomfort to left arm  from the history appears to be more arrythmia related than ACS  patient admitted to telemetry  cardiac enzyme negative X2 : ruled out for ACS   CTA Coronaries : Tortuous coronary arteries.  Rest of the visualized epicardial coronary arteries have no plaque  ECHO 7/05:  Normal left ventricular cavity size. The left ventricular systolic function is normal. There are no regional wall motion abnormalities seen.  cardiologist and EP appreciated   As per cardiology Toprol xl increased to 50mg daily.    Chronic atrial fibrillation.   Continue home medications : Metoprolol  and Xarelto   cardiologist and EP on board      Hypertension.   -Continue home medications and monitor. Diovan, Norvasc, HCTZ,   adjust as needed per cardiologist     AMS (altered mental status).   ·  Plan: episode of " brain fog" as described, transient  likely related to above ( suspect arrythmia / pause/ VT...?  )  tele monitoring  CT head noted as above without major issues..   patient back to normal shortly thereafter..   monitor for now and follow up with Neurology as outpatient.

## 2023-07-05 NOTE — PROGRESS NOTE ADULT - SUBJECTIVE AND OBJECTIVE BOX
EP     DATE OF SERVICE: 07-05-23    Patient reports no further AMS episodes. denies chest pain, palpitations, or shortness of breath.   Review of systems otherwise negative.  	  MEDICATIONS:  MEDICATIONS  (STANDING):  amLODIPine   Tablet 10 milliGRAM(s) Oral daily  enoxaparin Injectable 120 milliGRAM(s) SubCutaneous every 12 hours  hydrochlorothiazide 25 milliGRAM(s) Oral daily  metoprolol tartrate 25 milliGRAM(s) Oral two times a day  valsartan 320 milliGRAM(s) Oral daily      LABS:	 	    CARDIAC MARKERS:  CARDIAC MARKERS ( 04 Jul 2023 13:29 )  x     / x     / 117 U/L / x     / 2.0 ng/mL                                11.3   6.52  )-----------( 271      ( 05 Jul 2023 07:15 )             37.1     Hemoglobin: 11.3 g/dL (07-05 @ 07:15)  Hemoglobin: 12.2 g/dL (07-04 @ 03:21)      07-05    141  |  104  |  25<H>  ----------------------------<  80  3.9   |  23  |  1.23    Ca    8.9      05 Jul 2023 07:15  Mg     2.1     07-04    TPro  7.3  /  Alb  3.8  /  TBili  0.2  /  DBili  x   /  AST  18  /  ALT  18  /  AlkPhos  83  07-05    Creatinine Trend: 1.23<--, 0.87<--    COAGS:       proBNP:   Lipid Profile:   HgA1c:   TSH: Thyroid Stimulating Hormone, Serum: 0.48 uIU/mL (07-05 @ 07:15)        PHYSICAL EXAM:  T(C): 36.8 (07-05-23 @ 04:31), Max: 37.1 (07-04-23 @ 18:33)  HR: 72 (07-05-23 @ 04:31) (66 - 72)  BP: 125/69 (07-05-23 @ 04:31) (117/83 - 128/81)  RR: 18 (07-05-23 @ 04:31) (16 - 18)  SpO2: 96% (07-05-23 @ 04:31) (95% - 99%)  Wt(kg): --  I&O's Summary      Gen: NAD  HEENT:  (-)icterus (-)pallor  CV: N S1 S2 1/6 ASHLYN (+)2 Pulses B/l  Resp:  Clear to auscultation B/L, normal effort  GI: (+) BS Soft, NT, ND  Lymph:  (-)Edema, (-)obvious lymphadenopathy  Skin: Warm to touch, Normal turgor  Psych: Appropriate mood and affect      TELEMETRY: SB/SR 50-70s	    ECG: NSR 	  Echo: pending    ASSESSMENT/PLAN: Ms Duffy is a pleasant 58y Female with PMH of PAF and HTN here with palpitations, chest discomfort and lightheadedness.  Ruled out for acute MI with negative troponin x 2.  Maintain cardiac telemetry.  Check an echocardiogram - awaiting results  Continue Metoprolol TARTRATE 25mg BID.  Consider CTA-Heart after echo, with HR around 50-70bpm.  Stop Xarelto in-hospital. Use treatment dose Lovenox (1mg/kg BID) in case she needs invasive procedures.  Will follow with you.      Taiwo Cee PA-C  Pager: [phone]         EP     DATE OF SERVICE: 07-05-23    Patient reports no further AMS episodes. denies chest pain, palpitations, or shortness of breath.   Review of systems otherwise negative.  	  MEDICATIONS:  MEDICATIONS  (STANDING):  amLODIPine   Tablet 10 milliGRAM(s) Oral daily  enoxaparin Injectable 120 milliGRAM(s) SubCutaneous every 12 hours  hydrochlorothiazide 25 milliGRAM(s) Oral daily  metoprolol tartrate 25 milliGRAM(s) Oral two times a day  valsartan 320 milliGRAM(s) Oral daily      LABS:	 	    CARDIAC MARKERS:  CARDIAC MARKERS ( 04 Jul 2023 13:29 )  x     / x     / 117 U/L / x     / 2.0 ng/mL                                11.3   6.52  )-----------( 271      ( 05 Jul 2023 07:15 )             37.1     Hemoglobin: 11.3 g/dL (07-05 @ 07:15)  Hemoglobin: 12.2 g/dL (07-04 @ 03:21)      07-05    141  |  104  |  25<H>  ----------------------------<  80  3.9   |  23  |  1.23    Ca    8.9      05 Jul 2023 07:15  Mg     2.1     07-04    TPro  7.3  /  Alb  3.8  /  TBili  0.2  /  DBili  x   /  AST  18  /  ALT  18  /  AlkPhos  83  07-05    Creatinine Trend: 1.23<--, 0.87<--    COAGS:       proBNP:   Lipid Profile:   HgA1c:   TSH: Thyroid Stimulating Hormone, Serum: 0.48 uIU/mL (07-05 @ 07:15)        PHYSICAL EXAM:  T(C): 36.8 (07-05-23 @ 04:31), Max: 37.1 (07-04-23 @ 18:33)  HR: 72 (07-05-23 @ 04:31) (66 - 72)  BP: 125/69 (07-05-23 @ 04:31) (117/83 - 128/81)  RR: 18 (07-05-23 @ 04:31) (16 - 18)  SpO2: 96% (07-05-23 @ 04:31) (95% - 99%)  Wt(kg): --  I&O's Summary      Gen: NAD  HEENT:  (-)icterus (-)pallor  CV: N S1 S2 1/6 ASHLYN (+)2 Pulses B/l  Resp:  Clear to auscultation B/L, normal effort  GI: (+) BS Soft, NT, ND  Lymph:  (-)Edema, (-)obvious lymphadenopathy  Skin: Warm to touch, Normal turgor  Psych: Appropriate mood and affect      TELEMETRY: SB/SR 50-70s	    ECG: NSR 	  Echo: pending    ASSESSMENT/PLAN: Ms Duffy is a pleasant 58y Female with PMH of PAF and HTN here with palpitations, chest discomfort and lightheadedness.  Ruled out for acute MI with negative troponin x 2.  Maintain cardiac telemetry.  Check an echocardiogram - awaiting results  Continue Metoprolol TARTRATE 25mg BID.  Plan for CTA-Heart if echo unremarkable, with HR around 50-70bpm.  Stop Xarelto in-hospital. Use treatment dose Lovenox (1mg/kg BID) in case she needs invasive procedures.  Will follow with you.      Taiwo Cee PA-C  Pager: [phone]

## 2023-07-05 NOTE — DISCHARGE NOTE PROVIDER - NSDCCAREPROVSEEN_GEN_ALL_CORE_FT
Eun Hargrove Eun Callaway  [facility] Glenna Gates  Ordering Physician  Evelio Wilkins  [facility]      [ Greater than 35 min spent for discharge services.   ANTIONE Hargrove ]

## 2023-07-05 NOTE — PATIENT PROFILE ADULT - FALL HARM RISK - UNIVERSAL INTERVENTIONS
Bed in lowest position, wheels locked, appropriate side rails in place/Call bell, personal items and telephone in reach/Instruct patient to call for assistance before getting out of bed or chair/Non-slip footwear when patient is out of bed/[place] to call system/Physically safe environment - no spills, clutter or unnecessary equipment/Purposeful Proactive Rounding/Room/bathroom lighting operational, light cord in reach

## 2023-07-05 NOTE — DISCHARGE NOTE PROVIDER - CARE PROVIDER_API CALL
Wendy Gomez  Internal Medicine  [address] [facility] [address] [facility], [address]  [place] [address]  Phone: [phone]  Fax: [phone]  Follow Up Time: 1-3 days    Peter Brooks  Cardiac Electrophysiology  [address] [facility], [address] [address]  Phone: [phone]  Fax: [phone]  Follow Up Time: 1 week   Wendy Gomez  Internal Medicine  [address] [address], [address]  [place] [address]  Phone: [phone]  Fax: [phone]  Follow Up Time: 1-3 days    Vania Rangel  Cardiovascular Disease  [address], [address]  [place] [address]  Phone: [phone]  Fax: [phone]  Follow Up Time: 1 week    Evelio Yates  Neurology  [address] [facility] [address] [place], [address]  [place] [address]  Phone: [phone]  Fax: [phone]  Follow Up Time: 1 week

## 2023-07-05 NOTE — DISCHARGE NOTE PROVIDER - NSDCMRMEDTOKEN_GEN_ALL_CORE_FT
amLODIPine 10 mg oral tablet: 1 tab(s) orally once a day  hydroCHLOROthiazide 25 mg oral tablet: 1 tab(s) orally once a day  Iron: 1 tablet orally once a day  metoprolol succinate 25 mg oral tablet, extended release: 1 tab(s) orally once a day NOTE: As per Pharmacy, 1 tablet orally once a day(at bedtime)  valsartan 320 mg oral tablet: 1 tab(s) orally once a day  Vitamin D: 1 tablet orally once a day  Xarelto 20 mg oral tablet: 1 tab(s) orally once a day   aluminum hydroxide-magnesium hydroxide 200 mg-200 mg/5 mL oral suspension: 30 milliliter(s) orally every 4 hours As needed Dyspepsia  amLODIPine 10 mg oral tablet: 1 tab(s) orally once a day  hydroCHLOROthiazide 25 mg oral tablet: 1 tab(s) orally once a day  Iron: 1 tablet orally once a day  metoprolol succinate 50 mg oral tablet, extended release: 1 tab(s) orally once a day  valsartan 320 mg oral tablet: 1 tab(s) orally once a day  Vitamin D: 1 tablet orally once a day  Xarelto 20 mg oral tablet: 1 tab(s) orally once a day

## 2023-07-05 NOTE — DISCHARGE NOTE PROVIDER - PROVIDER TOKENS
PROVIDER:[TOKEN:[[id number]],FOLLOWUP:[1-3 days]],PROVIDER:[TOKEN:[[id number]],FOLLOWUP:[1 week]] PROVIDER:[TOKEN:[[id number]],FOLLOWUP:[1-3 days]],PROVIDER:[TOKEN:[[id number]],FOLLOWUP:[1 week]],PROVIDER:[TOKEN:[[id number]],FOLLOWUP:[1 week]]

## 2023-07-05 NOTE — DISCHARGE NOTE PROVIDER - NSDCCPCAREPLAN_GEN_ALL_CORE_FT
PRINCIPAL DISCHARGE DIAGNOSIS  Diagnosis: Chest discomfort  Assessment and Plan of Treatment:       SECONDARY DISCHARGE DIAGNOSES  Diagnosis: Chronic atrial fibrillation  Assessment and Plan of Treatment: Atrial fibrillation is the most common heart rhythm problem & has the risk of stroke & heart attack  It helps if you control your blood pressure, not drink more than 1-2 alcohol drinks per day, cut down on caffeine, getting treatment for over active thyroid gland, & getting exercise  Call your doctor if you feel your heart racing or beating unusually, chest tightness or pain, lightheaded, faint, shortness of breath especially with exercise  It is important to take your heart medication as prescribed  You may be on anticoagulation which is very important to take as directed - you may need blood work to monitor drug levels      Diagnosis: Hypertension  Assessment and Plan of Treatment:      PRINCIPAL DISCHARGE DIAGNOSIS  Diagnosis: Chest discomfort  Assessment and Plan of Treatment: Resolved.  Work up negative.  Continue to take your medications as prescribed and follow up with cardiology/pmd as outpatient.      SECONDARY DISCHARGE DIAGNOSES  Diagnosis: Chronic atrial fibrillation  Assessment and Plan of Treatment: Please note that your Toprol dose has been increased.  Atrial fibrillation is the most common heart rhythm problem & has the risk of stroke & heart attack  It helps if you control your blood pressure, not drink more than 1-2 alcohol drinks per day, cut down on caffeine, getting treatment for over active thyroid gland, & getting exercise  Call your doctor if you feel your heart racing or beating unusually, chest tightness or pain, lightheaded, faint, shortness of breath especially with exercise  It is important to take your heart medication as prescribed  You may be on anticoagulation which is very important to take as directed - you may need blood work to monitor drug levels      Diagnosis: Hypertension  Assessment and Plan of Treatment:      PRINCIPAL DISCHARGE DIAGNOSIS  Diagnosis: Chest discomfort  Assessment and Plan of Treatment: Resolved.  Work up negative.  Continue to take your medications as prescribed and follow up with cardiology/pmd as outpatient.      SECONDARY DISCHARGE DIAGNOSES  Diagnosis: Chronic atrial fibrillation  Assessment and Plan of Treatment: Please note that your Toprol dose has been increased.  Atrial fibrillation is the most common heart rhythm problem & has the risk of stroke & heart attack  It helps if you control your blood pressure, not drink more than 1-2 alcohol drinks per day, cut down on caffeine, getting treatment for over active thyroid gland, & getting exercise  Call your doctor if you feel your heart racing or beating unusually, chest tightness or pain, lightheaded, faint, shortness of breath especially with exercise  It is important to take your heart medication as prescribed  You may be on anticoagulation which is very important to take as directed - you may need blood work to monitor drug levels      Diagnosis: AMS (altered mental status)  Assessment and Plan of Treatment: Resolved.  Follow up with neurology.  Please call to schedule your appointment.    Diagnosis: Hypertension  Assessment and Plan of Treatment: Continue to take your medications as prescribed.

## 2023-07-06 ENCOUNTER — TRANSCRIPTION ENCOUNTER (OUTPATIENT)
Age: 58
End: 2023-07-06

## 2023-07-06 VITALS
HEART RATE: 75 BPM | OXYGEN SATURATION: 97 % | SYSTOLIC BLOOD PRESSURE: 110 MMHG | TEMPERATURE: 98 F | RESPIRATION RATE: 18 BRPM | DIASTOLIC BLOOD PRESSURE: 70 MMHG

## 2023-07-06 PROCEDURE — 85018 HEMOGLOBIN: CPT

## 2023-07-06 PROCEDURE — C8929: CPT

## 2023-07-06 PROCEDURE — 84443 ASSAY THYROID STIM HORMONE: CPT

## 2023-07-06 PROCEDURE — 84484 ASSAY OF TROPONIN QUANT: CPT

## 2023-07-06 PROCEDURE — 75574 CT ANGIO HRT W/3D IMAGE: CPT

## 2023-07-06 PROCEDURE — 85014 HEMATOCRIT: CPT

## 2023-07-06 PROCEDURE — 82803 BLOOD GASES ANY COMBINATION: CPT

## 2023-07-06 PROCEDURE — 82330 ASSAY OF CALCIUM: CPT

## 2023-07-06 PROCEDURE — 82550 ASSAY OF CK (CPK): CPT

## 2023-07-06 PROCEDURE — 85025 COMPLETE CBC W/AUTO DIFF WBC: CPT

## 2023-07-06 PROCEDURE — 83036 HEMOGLOBIN GLYCOSYLATED A1C: CPT

## 2023-07-06 PROCEDURE — 99285 EMERGENCY DEPT VISIT HI MDM: CPT

## 2023-07-06 PROCEDURE — 82553 CREATINE MB FRACTION: CPT

## 2023-07-06 PROCEDURE — 84132 ASSAY OF SERUM POTASSIUM: CPT

## 2023-07-06 PROCEDURE — 83605 ASSAY OF LACTIC ACID: CPT

## 2023-07-06 PROCEDURE — 71046 X-RAY EXAM CHEST 2 VIEWS: CPT

## 2023-07-06 PROCEDURE — 80053 COMPREHEN METABOLIC PANEL: CPT

## 2023-07-06 PROCEDURE — 84295 ASSAY OF SERUM SODIUM: CPT

## 2023-07-06 PROCEDURE — 82435 ASSAY OF BLOOD CHLORIDE: CPT

## 2023-07-06 PROCEDURE — 36415 COLL VENOUS BLD VENIPUNCTURE: CPT

## 2023-07-06 PROCEDURE — 82947 ASSAY GLUCOSE BLOOD QUANT: CPT

## 2023-07-06 PROCEDURE — 93005 ELECTROCARDIOGRAM TRACING: CPT

## 2023-07-06 PROCEDURE — 80061 LIPID PANEL: CPT

## 2023-07-06 PROCEDURE — 70450 CT HEAD/BRAIN W/O DYE: CPT | Mod: MA

## 2023-07-06 RX ORDER — METOPROLOL TARTRATE 50 MG
50 TABLET ORAL DAILY
Refills: 0 | Status: DISCONTINUED | OUTPATIENT
Start: 2023-07-07 | End: 2023-07-06

## 2023-07-06 RX ORDER — METOPROLOL TARTRATE 50 MG
1 TABLET ORAL
Qty: 30 | Refills: 0
Start: 2023-07-06 | End: 2023-08-04

## 2023-07-06 RX ORDER — METOPROLOL TARTRATE 50 MG
1 TABLET ORAL
Refills: 0 | DISCHARGE

## 2023-07-06 RX ADMIN — AMLODIPINE BESYLATE 10 MILLIGRAM(S): 2.5 TABLET ORAL at 06:18

## 2023-07-06 RX ADMIN — Medication 25 MILLIGRAM(S): at 06:18

## 2023-07-06 NOTE — PROGRESS NOTE ADULT - ASSESSMENT
_________________________________________________________________________________________  ========>>  M E D I C A L   A T T E N D I N G    F O L L O W  U P  N O T E  <<=========  -----------------------------------------------------------------------------------------------------    - Patient seen and examined by me earlier today.   - In summary,  MICHOACANO CORNELL is a 58y year old woman admitted with chest discomfort   - Patient today overall doing ok, comfortable, eating OK.  slept ok    on tele sinus with some PVCs     ==================>> REVIEW OF SYSTEM <<=================    GEN: no fever, no chills, no pain  RESP: no SOB, no cough, no sputum  CVS: no chest pain, no palpitations, no edema  GI: no abdominal pain, no nausea, no constipation, no diarrhea  : no dysuria, no frequency, no hematuria  Neuro: no headache, no dizziness  Derm : no itching, no rash    ==================>> PHYSICAL EXAM <<=================    GEN: A&O X 3 , NAD , comfortable, pleasant, calm   HEENT: NCAT, PERRL, MMM, hearing intact  Neck: supple , no JVD appreciated  CVS: S1S2 , regular , No M/R/G appreciated  PULM: CTA B/L,  no W/R/R appreciated, limited exam due to body habitus.   ABD.: soft. non tender, non distended,  bowel sounds present, obese   Extrem: intact pulses , no edema   PSYCH : normal mood,  not anxious                            ( Note Written / Date of service :  07-05-23 )    ==================>> MEDICATIONS <<====================    MEDICATIONS  (STANDING):  amLODIPine   Tablet 10 milliGRAM(s) Oral daily  enoxaparin Injectable 120 milliGRAM(s) SubCutaneous every 12 hours  hydrochlorothiazide 25 milliGRAM(s) Oral daily  metoprolol tartrate 25 milliGRAM(s) Oral two times a day  valsartan 320 milliGRAM(s) Oral daily    MEDICATIONS  (PRN):  acetaminophen     Tablet .. 650 milliGRAM(s) Oral every 6 hours PRN Temp greater or equal to 38C (100.4F), Mild Pain (1 - 3)  aluminum hydroxide/magnesium hydroxide/simethicone Suspension 30 milliLiter(s) Oral every 4 hours PRN Dyspepsia  melatonin 3 milliGRAM(s) Oral at bedtime PRN Insomnia  ondansetron Injectable 4 milliGRAM(s) IV Push every 8 hours PRN Nausea and/or Vomiting    ___________  Active diet:  Diet, Regular:   DASH/TLC Sodium & Cholesterol Restricted (DASH)  ___________________    ==================>> VITAL SIGNS <<==================    T(C): 36.8 (07-05-23 @ 04:31), Max: 37.1 (07-04-23 @ 18:33)  HR: 72 (07-05-23 @ 04:31) (66 - 72)  BP: 125/69 (07-05-23 @ 04:31) (117/83 - 128/81)  BP(mean): 94 (07-04-23 @ 13:25)  RR: 18 (07-05-23 @ 04:31) (16 - 18)  SpO2: 96% (07-05-23 @ 04:31) (95% - 99%)      ==================>> LAB AND IMAGING <<==================                        11.3   6.52  )-----------( 271      ( 05 Jul 2023 07:15 )             37.1        07-05    141  |  104  |  25<H>  ----------------------------<  80  3.9   |  23  |  1.23    Ca    8.9      05 Jul 2023 07:15  Mg     2.1     07-04    TPro  7.3  /  Alb  3.8  /  TBili  0.2  /  DBili  x   /  AST  18  /  ALT  18  /  AlkPhos  83  07-05      ~~ High Sensitivity Troponin  ~~  7  <<--, <6  <<--     Urinalysis Basic - ( 05 Jul 2023 07:15 )  Color: x / Appearance: x / SG: x / pH: x  Gluc: 80 mg/dL / Ketone: x  / Bili: x / Urobili: x   Blood: x / Protein: x / Nitrite: x   Leuk Esterase: x / RBC: x / WBC x   Sq Epi: x / Non Sq Epi: x / Bacteria: x    TSH:  P    Lipid profile:  (07-05-23)     Total: 166     LDL  : (p)     HDL  :63     TG   :98     HgA1C:  P    ___________________________________________________________________________________  ===============>>  A S S E S S M E N T   A N D   P L A N <<===============  ------------------------------------------------------------------------------------------    · Assessment	  Patient is a 59yo Female with past medical history of HTN, HLD, AF, presented to the ED with a chief complaint chest discomfort / pressure.    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 then developed seating, chest heaviness and tingling of left arm .. lasting about 5 minutes and resolved...      she had possibly some palpitations as well.. never passed out .. per patient no monitor was available with EMS upon arrival so unknown rhythm .    Patient states that similar  events have  happened to her  about 3 times in the past 3 months.  Patient states today the symptoms were different as and it was going down the left arm.    patient states had an echo last year with cardiologist and stress test in 2020  Patient denies nausea, vomiting, shortness of breath, headache, visual changes abdominal, urinary symptoms, diarrhea or constipation.  otherwise now comfortable in ER .. admitted for further workup        Problem/Plan - 1:  ·  Problem: Chest discomfort   ·  Plan: patient with episode of chest heaviness and radiation of tingling and discomfort to left arm  from the history appears to be more arrythmia related than ACS  patient admitted to telemetry  cardiac enzyme negative X2 : ruled out for ACS   cardiologist and EP appreciated   continue home medications for now  monitor vitals labs    Problem/Plan - 2:  ·  Problem: Chronic atrial fibrillation.   ·  Plan: as above   patient with occasional palpitations but episode this time was different as noted...   Continue home medications and monitor.  tele monitoring  monitor electrolytes  cardiologist and EP on board  transitioned Xarelto to Lovenox in house in case patient needs invasive procedure..     Problem/Plan - 3:  ·  Problem: Hypertension.   ·  Plan: Continue home medications and monitor.  adjust as needed per cardiologist     Problem/Plan - 4:  ·  Problem: AMS (altered mental status).   ·  Plan: episode of " brain fog" as described, transient  likely related to above ( suspect arrythmia / pause/ VT...?  )  tele monitoring  CT head noted as above without major issues..   patient back to normal shortly thereafter..   monitor for now  supportive care.    --------------------------------------------  Case discussed with patient , RN..   Education given on findings and plan of care  ___________________________  H. MELLY Hargrove.  Pager: [phone]    
  _________________________________________________________________________________________  ========>>  M E D I C A L   A T T E N D I N G    F O L L O W  U P  N O T E  <<=========  -----------------------------------------------------------------------------------------------------    - Patient seen and examined by me earlier today.   - In summary,  MICHOACANO CORNELL is a 58y year old woman admitted with chest discomfort   - Patient today overall doing ok, comfortable, eating OK.  wants to go home     ==================>> REVIEW OF SYSTEM <<=================    GEN: no fever, no chills, no pain  RESP: no SOB, no cough, no sputum  CVS: no chest pain, no palpitations, no edema  GI: no abdominal pain, no nausea, no constipation, no diarrhea  : no dysuria, no frequency, no hematuria  Neuro: no headache, no dizziness  Derm : no itching, no rash    ==================>> PHYSICAL EXAM <<=================    GEN: A&O X 3 , NAD , comfortable, pleasant, calm   HEENT: NCAT, PERRL, MMM, hearing intact  Neck: supple , no JVD appreciated  CVS: S1S2 , regular , No M/R/G appreciated  PULM: CTA B/L,  no W/R/R appreciated, limited exam due to body habitus.   ABD.: soft. non tender, non distended,  bowel sounds present, obese   Extrem: intact pulses , no edema   PSYCH : normal mood,  not anxious                                 ( Note written / Date of service 07-06-23 )    ==================>> MEDICATIONS <<====================    amLODIPine   Tablet 10 milliGRAM(s) Oral daily  hydrochlorothiazide 25 milliGRAM(s) Oral daily  metoprolol tartrate 25 milliGRAM(s) Oral two times a day  rivaroxaban 20 milliGRAM(s) Oral with dinner  sodium chloride 0.9%. 1000 milliLiter(s) IV Continuous <Continuous>  valsartan 320 milliGRAM(s) Oral daily    MEDICATIONS  (PRN):  acetaminophen     Tablet .. 650 milliGRAM(s) Oral every 6 hours PRN Temp greater or equal to 38C (100.4F), Mild Pain (1 - 3)  aluminum hydroxide/magnesium hydroxide/simethicone Suspension 30 milliLiter(s) Oral every 4 hours PRN Dyspepsia  melatonin 3 milliGRAM(s) Oral at bedtime PRN Insomnia  ondansetron Injectable 4 milliGRAM(s) IV Push every 8 hours PRN Nausea and/or Vomiting    ___________  Active diet:  Diet, Regular:   DASH/TLC Sodium & Cholesterol Restricted (DASH)  ___________________    ==================>> VITAL SIGNS <<==================    Height (cm): 167.6  Weight (kg): 119.7  BMI (kg/m2): 42.6  Vital Signs Last 24 HrsT(C): 36.6 (07-06-23 @ 04:40)  T(F): 97.8 (07-06-23 @ 04:40), Max: 97.8 (07-06-23 @ 04:40)  HR: 65 (07-06-23 @ 04:40) (65 - 69)  BP: 125/60 (07-06-23 @ 04:40)  RR: 18 (07-06-23 @ 04:40) (18 - 18)  SpO2: 96% (07-06-23 @ 04:40) (96% - 96%)         ==================>> LAB AND IMAGING <<==================                        11.3   6.52  )-----------( 271      ( 05 Jul 2023 07:15 )             37.1        07-05    141  |  104  |  25<H>  ----------------------------<  80  3.9   |  23  |  1.23    Ca    8.9      05 Jul 2023 07:15    TPro  7.3  /  Alb  3.8  /  TBili  0.2  /  DBili  x   /  AST  18  /  ALT  18  /  AlkPhos  83  07-05    WBC count:   6.52 <<== ,  8.98 <<==   Hemoglobin:   11.3 <<==,  12.2 <<==  platelets:  271 <==, 304 <==    Creatinine:  1.23  <<==, 0.87  <<==  Sodium:   141  <==, 139  <==       AST:          18 <== , 24 <==      ALT:        18  <== , 20  <==      AP:        83  <=, 91  <=     Bili:        0.2  <=, 0.2  <=    Thyroid Stimulating Hormone, Serum: 0.48 uIU/mL (07.05.23 @ 07:15)    Lipid profile:  (07-05-23)     Total: 166     LDL  : (p)     HDL  :63     TG   :98    A1C with Estimated Average Glucose Result: 5.8: % (07.05.23 @ 07:15)      ___________________________________________________________________________________  ===============>>  A S S E S S M E N T   A N D   P L A N <<===============  ------------------------------------------------------------------------------------------    · Assessment	  Patient is a 59yo Female with past medical history of HTN, HLD, AF, presented to the ED with a chief complaint chest discomfort / pressure.    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 then developed seating, chest heaviness and tingling of left arm .. lasting about 5 minutes and resolved...      she had possibly some palpitations as well.. never passed out .. per patient no monitor was available with EMS upon arrival so unknown rhythm .    Patient states that similar  events have  happened to her  about 3 times in the past 3 months.  Patient states today the symptoms were different as and it was going down the left arm.    patient states had an echo last year with cardiologist and stress test in 2020  Patient denies nausea, vomiting, shortness of breath, headache, visual changes abdominal, urinary symptoms, diarrhea or constipation.  otherwise now comfortable in ER .. admitted for further workup        Problem/Plan - 1:  ·  Problem: Chest discomfort   ·  Plan: patient with episode of chest heaviness and radiation of tingling and discomfort to left arm  from the history appears to be more arrythmia related than ACS  patient admitted to telemetry  cardiac enzyme negative X2 : ruled out for ACS   CT coronary noted  cardiologist and EP appreciated   continue home medications for now  monitor vitals labs  for cardiac monitor as outpatient per EP  neurology appreciated and discussed :: OPO workup with Open MR and EEG    Problem/Plan - 2:  ·  Problem: Chronic atrial fibrillation.   ·  Plan: as above   patient with occasional palpitations but episode this time was different as noted...   Continue home medications and monitor.  tele monitoring  monitor electrolytes  cardiologist and EP on board  Xarelto     Problem/Plan - 3:  ·  Problem: Hypertension.   ·  Plan: Continue home medications and monitor.  adjust as needed per cardiologist     Problem/Plan - 4:  ·  Problem: AMS (altered mental status).   ·  Plan: episode of " brain fog" as described, transient  likely related to above ( suspect arrythmia / pause/ VT...?  )  tele monitoring  CT head noted as above without major issues..   patient back to normal shortly thereafter..   monitor for now  supportive care.    DC plan home   --------------------------------------------  Case discussed with patient , NP, specialsis   Education given on findings and plan of care  ___________________________  H. MELLY Hargrove.  Pager: [phone]    
Patient seen and examined, agree with above assessment and plan as transcribed above.    - Oupt neuro f/u  - No need for further inpatient cardiac work up.    Beck Callaway MD, [facility]  BEEPER [phone]

## 2023-07-06 NOTE — CONSULT NOTE ADULT - SUBJECTIVE AND OBJECTIVE BOX
C A R D I O L O G Y  *********************    DATE OF SERVICE: 07-04-23    HISTORY OF PRESENT ILLNESS: HPI:  Patient is a 57 y/o female well known to our office with PMH of PAF on Xarelto and HTN who is admitted with chest pressure and episodes of AMS. Cardiology consulted for further evaluation. 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 She then developed chest heaviness, palpitations, tingling of her left arm, and diaphoresis prompting EMS to be called at work. She reports EMS was not able to setup cardiac monitor to determine what rhythm she was in. She has difficulty remembering the events but reports this has happened a few times this year however episodes never lasted this long of 15-20 min. Reports less chest pressure. Denies SOB, dizziness, or syncope.    PAST MEDICAL & SURGICAL HISTORY:  HTN (hypertension)      Carpal tunnel syndrome      Anemia      Obesity      Vitamin B12 deficiency      Lumbar disc disease      Ex-smoker      Atrial fibrillation      S/P gastric surgery  gastric sleeve 2010      S/P hernia repair  hiatal hernia 2011      S/P gastric bypass  2011      S/P carpal tunnel release  1996      S/P dilatation and curettage  11/2012      Hydradenitis  surgery, at age 22      Disorder of patella  hx of patella/quad repair 1999              MEDICATIONS:  MEDICATIONS  (STANDING):  amLODIPine   Tablet 10 milliGRAM(s) Oral daily  hydrochlorothiazide 25 milliGRAM(s) Oral daily  metoprolol succinate ER 25 milliGRAM(s) Oral daily  rivaroxaban 20 milliGRAM(s) Oral with dinner  valsartan 320 milliGRAM(s) Oral daily      Allergies    Levaquin (Headache)  nitroimidazole amebicides (Unknown)  Flagyl (Rash)  fluoroquinolone antibiotics (Other)  Flagyl (Hives)    Intolerances        FAMILY HISTORY:    Non-contributary for premature coronary disease or sudden cardiac death    SOCIAL HISTORY:    [x ] Non-smoker  [ ] Smoker  [ ] Alcohol    FLU VACCINE THIS YEAR STARTS IN AUGUST:  [ ] Yes    [ ] No    IF OVER 65 HAVE YOU EVER HAD A PNA VACCINE:  [ ] Yes    [ ] No       [ ] N/A      REVIEW OF SYSTEMS:  [ x]chest pain  [  ]shortness of breath  [ x ]palpitations  [  ]syncope  [ ]near syncope [ ]upper extremity weakness   [ ] lower extremity weakness  [  ]diplopia  [x  ]altered mental status   [  ]fevers  [ ]chills [ ]nausea  [ ]vomiting  [  ]dysphagia    [ ]abdominal pain  [ ]melena  [ ]BRBPR    [  ]epistaxis  [  ]rash    [ ]lower extremity edema        [X] All others negative	  [ ] Unable to obtain      LABS:	 	    CARDIAC MARKERS:  CARDIAC MARKERS ( 04 Jul 2023 13:29 )  x     / x     / 117 U/L / x     / 2.0 ng/mL                              12.2   8.98  )-----------( 304      ( 04 Jul 2023 03:21 )             40.1     Hb Trend: 12.2<--    07-04    139  |  100  |  16  ----------------------------<  70  3.9   |  28  |  0.87    Ca    10.0      04 Jul 2023 03:21  Mg     2.1     07-04    TPro  8.5<H>  /  Alb  4.6  /  TBili  0.2  /  DBili  x   /  AST  24  /  ALT  20  /  AlkPhos  91  07-04    Creatinine Trend: 0.87<--    Coags:      proBNP:   Lipid Profile:   HgA1c:   TSH:         PHYSICAL EXAM:  T(C): 36.4 (07-04-23 @ 13:25), Max: 36.8 (07-04-23 @ 07:35)  HR: 69 (07-04-23 @ 13:25) (64 - 81)  BP: 117/83 (07-04-23 @ 13:25) (117/83 - 162/93)  RR: 18 (07-04-23 @ 13:25) (16 - 18)  SpO2: 99% (07-04-23 @ 13:25) (95% - 99%)  Wt(kg): --   BMI (kg/m2): 42.6 (07-03-23 @ 21:26)  I&O's Summary      Gen: NAD  HEENT:  (-)icterus (-)pallor  CV: N S1 S2 1/6 ASHLYN (+)2 Pulses B/l  Resp:  Clear to auscultation B/L, normal effort  GI: (+) BS Soft, NT, ND  Lymph:  (-)Edema, (-)obvious lymphadenopathy  Skin: Warm to touch, Normal turgor  Psych: Appropriate mood and affect      TELEMETRY: SR 60s, occasional PVCs	      ECG: SR, narrow QRS, PVC  	    RADIOLOGY:         CXR: < from: Xray Chest 2 Views PA/Lat (07.04.23 @ 04:32) >  IMPRESSION:  Clear lungs.    --- End of Report ---    < end of copied text >      ASSESSMENT/PLAN: Patient is a 57 y/o female well known to our office with PMH of PAF on Xarelto and HTN who is admitted with chest pressure and episodes of AMS. Cardiology consulted for further evaluation.     #Chest Pain  - MI ruled out  - Patient with normal cors on cath in 2014. Normal stress echo in office 5/2020.  - Check TTE  - Further plan pending TTE    #AMS  - Resolved, Unclear etiology  - CT Head negative  - Check TSH  - Monitor telemetry to see if correlating to arrhythmia    #PAF  - Continue Xarelto for stroke prevention  - Continue Toprol XL to prevent AF RVR  - EP consult with Dr. Brooks called for further eval to see if PAF correlating to symptoms    #HTN  - Continue Amlodipine, HCTZ, Valsartan, and Toprol XL    - Patient to f/u with Dr. Rangel after discharge    Taiwo Cee PA-C  Pager: [phone]  
  EP Attending  CHIEF COMPLAINT & HISTORY OF PRESENT ILLNESS:      Patient is a 57yo Female with past medical history of HTN, HLD, AF, presented to the ED with a chief complaint chest discomfort / pressure.    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 then developed seating, chest heaviness and tingling of left arm .. lasting about 5 minutes and resolved...      she had possibly some palpitations as well.. never passed out .. per patient no monitor was available with EMS upon arrival so unknown rhythm .    Patient states that similar  events have  happened to her  about 3 times in the past 3 months.  Patient states today the symptoms were different as and it was going down the left arm.    patient states had an echo last year with cardiologist and stress test in 2020  Patient denies nausea, vomiting, shortness of breath, headache, visual changes abdominal, urinary symptoms, diarrhea or constipation.  otherwise now comfortable in ER .. admitted for further workup    Has hx of paroxysmal AFib, takes xarelto for stroke prevention (20mg daily) and a beta blocker for rate-control strategy. Rare palpitations, including today, but was not on a heart monitor. A 10 pt ROS is otherwise negative.    PAST MEDICAL & SURGICAL HISTORY:  HTN (hypertension)  Carpal tunnel syndrome  Anemia  Obesity  Vitamin B12 deficiency  Lumbar disc disease  Ex-smoker  Atrial fibrillation  S/P gastric surgery  gastric sleeve 2010  S/P hernia repair  hiatal hernia 2011  S/P gastric bypass  2011  S/P carpal tunnel release  1996  S/P dilatation and curettage  11/2012  Hydradenitis  surgery, at age 22  Disorder of patella  hx of patella/quad repair 1999          MEDICATIONS  (STANDING):  amLODIPine   Tablet 10 milliGRAM(s) Oral daily  hydrochlorothiazide 25 milliGRAM(s) Oral daily  metoprolol succinate ER 25 milliGRAM(s) Oral daily  rivaroxaban 20 milliGRAM(s) Oral with dinner  valsartan 320 milliGRAM(s) Oral daily      Allergies    Levaquin (Headache)  nitroimidazole amebicides (Unknown)  Flagyl (Rash)  fluoroquinolone antibiotics (Other)  Flagyl (Hives)    Intolerances    FAMILY HISTORY:    Non-contributary for premature coronary disease or sudden cardiac death    SOCIAL HISTORY:    [x ] Non-smoker  [ ] Smoker  [ ] Alcohol    PHYSICAL EXAM:  T(C): 36.4 (07-04-23 @ 13:25), Max: 36.8 (07-04-23 @ 07:35)  HR: 69 (07-04-23 @ 13:25) (64 - 81)  BP: 117/83 (07-04-23 @ 13:25) (117/83 - 162/93)  RR: 18 (07-04-23 @ 13:25) (16 - 18)  SpO2: 99% (07-04-23 @ 13:25) (95% - 99%)  Wt(kg): --    Appearance: Normal appearing adult woman in no acute distress  HEENT:   Normal oral mucosa, PERRL, EOMI	  Lymphatic: No lymphadenopathy , no edema  Cardiovascular: Normal S1 S2, No JVD, No murmurs , Peripheral pulses palpable 2+ bilaterally  Respiratory: Lungs clear to auscultation, normal effort 	  Gastrointestinal:  Soft, Non-tender, + BS	  Skin: No rashes, No ecchymoses, No cyanosis, warm to touch  Musculoskeletal: Normal range of motion, normal strength  Psychiatry:  Mood & affect appropriate      TELEMETRY:NSR	    ECG: NSR 	  Echo: pending	  	  LABS:	 	                          12.2   8.98  )-----------( 304      ( 04 Jul 2023 03:21 )             40.1     07-04    139  |  100  |  16  ----------------------------<  70  3.9   |  28  |  0.87    Ca    10.0      04 Jul 2023 03:21  Mg     2.1     07-04    TPro  8.5<H>  /  Alb  4.6  /  TBili  0.2  /  DBili  x   /  AST  24  /  ALT  20  /  AlkPhos  91  07-04    ASSESSMENT/PLAN: Ms Duffy is a pleasant 58y Female here with palpitations, chest discomfort and lightheadedness.  Ruled out for acute MI with negative troponin x 2.  Maintain cardiac telemetry.  Check an echocardiogram.  Increase beta blocker dose - try Metoprolol TARTRATE 25mg BID.  Consider CTA-Heart after echo, with HR around 50-70bpm.  Stop Xarelto in-hospital. Use treatment dose Lovenox (1mg/kg BID) in case she needs invasive procedures.  Will follow with you.        Peter Brooks M.D.  Cardiac Electrophysiology    office [phone]  pager [phone]
Neurology Consult    Reason for Consult: Patient is a 58y old  Female who presents with a chief complaint of chest discomfort, fogginess (06 Jul 2023 09:19)      HPI:       57yo Female with past medical history of HTN, HLD, AF, presented to the ED with a chief complaint chest discomfort / pressure.    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 then developed seating, chest heaviness and tingling of left arm .. lasting about 5 minutes and resolved...      she had possibly some palpitations as well.. never passed out .. per patient no monitor was available with EMS upon arrival so unknown rhythm .    Patient states that similar  events have  happened to her  about 3 times in the past 3 months.  Patient states today the symptoms were different as and it was going down the left arm.    patient states had an echo last year with cardiologist and stress test in 2020  Patient denies nausea, vomiting, shortness of breath, headache, visual changes abdominal, urinary symptoms, diarrhea or constipation.  otherwise now comfortable in ER .. admitted for further workup        PAST MEDICAL & SURGICAL HISTORY:  HTN (hypertension)      Carpal tunnel syndrome      Anemia      Obesity      Vitamin B12 deficiency      Lumbar disc disease      Ex-smoker      Atrial fibrillation      S/P gastric surgery  gastric sleeve 2010      S/P hernia repair  hiatal hernia 2011      S/P gastric bypass  2011      S/P carpal tunnel release  1996      S/P dilatation and curettage  11/2012      Hydradenitis  surgery, at age 22      Disorder of patella  hx of patella/quad repair 1999          Allergies: Allergies    Levaquin (Headache)  nitroimidazole amebicides (Unknown)  Flagyl (Rash)  fluoroquinolone antibiotics (Other)  Flagyl (Hives)    Intolerances        Social History: Denies toxic habits including tobacco, ETOH or illicit drugs.    Family History: FAMILY HISTORY:  . No family history of strokes    Medications: MEDICATIONS  (STANDING):  amLODIPine   Tablet 10 milliGRAM(s) Oral daily  hydrochlorothiazide 25 milliGRAM(s) Oral daily  metoprolol tartrate 25 milliGRAM(s) Oral two times a day  rivaroxaban 20 milliGRAM(s) Oral with dinner  sodium chloride 0.9%. 1000 milliLiter(s) (70 mL/Hr) IV Continuous <Continuous>  valsartan 320 milliGRAM(s) Oral daily    MEDICATIONS  (PRN):  acetaminophen     Tablet .. 650 milliGRAM(s) Oral every 6 hours PRN Temp greater or equal to 38C (100.4F), Mild Pain (1 - 3)  aluminum hydroxide/magnesium hydroxide/simethicone Suspension 30 milliLiter(s) Oral every 4 hours PRN Dyspepsia  melatonin 3 milliGRAM(s) Oral at bedtime PRN Insomnia  ondansetron Injectable 4 milliGRAM(s) IV Push every 8 hours PRN Nausea and/or Vomiting      Review of Systems:  CONSTITUTIONAL:  No weight loss, fever, chills, weakness or fatigue.  HEENT:  Eyes:  No visual loss, blurred vision, double vision or yellow sclera. Ears, Nose, Throat:  No hearing loss, sneezing, congestion, runny nose or sore throat.  SKIN:  No rash or itching.  CARDIOVASCULAR:  No chest pain, chest pressure or chest discomfort. No palpitations or edema.  RESPIRATORY:  No shortness of breath, cough or sputum.  GASTROINTESTINAL:  No anorexia, nausea, vomiting or diarrhea. No abdominal pain or blood.  GENITOURINARY:  No burning on urination or incontinence   NEUROLOGICAL:  No headache, dizziness, syncope, paralysis, ataxia, numbness or tingling in the extremities. No change in bowel or bladder control. no limb weakness. no vision changes.   MUSCULOSKELETAL:  No muscle, back pain, joint pain or stiffness.  HEMATOLOGIC:  No anemia, bleeding or bruising.  LYMPHATICS:  No enlarged nodes. No history of splenectomy.  PSYCHIATRIC:  No history of depression or anxiety.  ENDOCRINOLOGIC:  No reports of sweating, cold or heat intolerance. No polyuria or polydipsia.      Vitals:  Vital Signs Last 24 Hrs  T(C): 36.6 (06 Jul 2023 04:40), Max: 36.6 (06 Jul 2023 04:40)  T(F): 97.8 (06 Jul 2023 04:40), Max: 97.8 (06 Jul 2023 04:40)  HR: 65 (06 Jul 2023 04:40) (65 - 74)  BP: 125/60 (06 Jul 2023 04:40) (111/68 - 125/60)  BP(mean): --  RR: 18 (06 Jul 2023 04:40) (18 - 18)  SpO2: 96% (06 Jul 2023 04:40) (95% - 96%)    Parameters below as of 06 Jul 2023 04:40  Patient On (Oxygen Delivery Method): room air        General Exam:   General Appearance: Appropriately dressed and in no acute distress       Head: Normocephalic, atraumatic and no dysmorphic features  Ear, Nose, and Throat: Moist mucous membranes  CVS: S1S2+  Resp: No SOB, no wheeze or rhonchi  GI: soft NT/ND  Extremities: No edema or cyanosis  Skin: No bruises or rashes     Neurological Exam:  Mental Status: Awake, alert and oriented x 3.  Able to follow simple and complex verbal commands. Able to name and repeat. fluent speech. No obvious aphasia or dysarthria noted.   Cranial Nerves: PERRL, EOMI, VFFC, sensation V1-V3 intact,  no obvious facial asymmetry, equal elevation of palate, scm/trap 5/5, tongue is midline on protrusion. no obvious papilledema on fundoscopic exam. hearing is grossly intact.   Motor: Normal bulk, tone and strength throughout. Fine finger movements were intact and symmetric. no tremors or drift noted.    Sensation: Intact to light touch and pinprick throughout. no right/left confusion. no extinction to tactile on DSS. Romberg was negative.   Reflexes: 1+ throughout at biceps, brachioradialis, triceps, patellars and ankles bilaterally and equal. No clonus. R toe and L toe were both downgoing.  Coordination: No dysmetria on FNF or HKS  Gait: Narrow based and steady. Able to tandem. no limitations in gait.     Data/Labs/Imaging which I personally reviewed.     Labs:     CBC Full  -  ( 05 Jul 2023 07:15 )  WBC Count : 6.52 K/uL  RBC Count : 4.51 M/uL  Hemoglobin : 11.3 g/dL  Hematocrit : 37.1 %  Platelet Count - Automated : 271 K/uL  Mean Cell Volume : 82.3 fl  Mean Cell Hemoglobin : 25.1 pg  Mean Cell Hemoglobin Concentration : 30.5 gm/dL  Auto Neutrophil # : 3.68 K/uL  Auto Lymphocyte # : 2.03 K/uL  Auto Monocyte # : 0.65 K/uL  Auto Eosinophil # : 0.11 K/uL  Auto Basophil # : 0.02 K/uL  Auto Neutrophil % : 56.4 %  Auto Lymphocyte % : 31.1 %  Auto Monocyte % : 10.0 %  Auto Eosinophil % : 1.7 %  Auto Basophil % : 0.3 %    07-05    141  |  104  |  25<H>  ----------------------------<  80  3.9   |  23  |  1.23    Ca    8.9      05 Jul 2023 07:15    TPro  7.3  /  Alb  3.8  /  TBili  0.2  /  DBili  x   /  AST  18  /  ALT  18  /  AlkPhos  83  07-05    LIVER FUNCTIONS - ( 05 Jul 2023 07:15 )  Alb: 3.8 g/dL / Pro: 7.3 g/dL / ALK PHOS: 83 U/L / ALT: 18 U/L / AST: 18 U/L / GGT: x             Urinalysis Basic - ( 05 Jul 2023 07:15 )    Color: x / Appearance: x / SG: x / pH: x  Gluc: 80 mg/dL / Ketone: x  / Bili: x / Urobili: x   Blood: x / Protein: x / Nitrite: x   Leuk Esterase: x / RBC: x / WBC x   Sq Epi: x / Non Sq Epi: x / Bacteria: x

## 2023-07-06 NOTE — PROGRESS NOTE ADULT - REASON FOR ADMISSION
chest discomfort, fogginess

## 2023-07-06 NOTE — DISCHARGE NOTE NURSING/CASE MANAGEMENT/SOCIAL WORK - PATIENT PORTAL LINK FT
You can access the FollowMyHealth Patient Portal offered by [facility] by registering at the following website: [email or web address]. By joining [facility]’s FollowMyHealth portal, you will also be able to view your health information using other applications (apps) compatible with our system.

## 2023-07-06 NOTE — PROGRESS NOTE ADULT - SUBJECTIVE AND OBJECTIVE BOX
C A R D I O L O G Y  **********************************     DATE OF SERVICE: 07-06-23    Patient denies chest pain, palpitations, dizziness, or shortness of breath.   Review of systems otherwise negative.  	  MEDICATIONS:  MEDICATIONS  (STANDING):  amLODIPine   Tablet 10 milliGRAM(s) Oral daily  hydrochlorothiazide 25 milliGRAM(s) Oral daily  metoprolol tartrate 25 milliGRAM(s) Oral two times a day  rivaroxaban 20 milliGRAM(s) Oral with dinner  sodium chloride 0.9%. 1000 milliLiter(s) (70 mL/Hr) IV Continuous <Continuous>  valsartan 320 milliGRAM(s) Oral daily      LABS:	 	    CARDIAC MARKERS:  CARDIAC MARKERS ( 04 Jul 2023 13:29 )  x     / x     / 117 U/L / x     / 2.0 ng/mL                                11.3   6.52  )-----------( 271      ( 05 Jul 2023 07:15 )             37.1     Hemoglobin: 11.3 g/dL (07-05 @ 07:15)  Hemoglobin: 12.2 g/dL (07-04 @ 03:21)      07-05    141  |  104  |  25<H>  ----------------------------<  80  3.9   |  23  |  1.23    Ca    8.9      05 Jul 2023 07:15    TPro  7.3  /  Alb  3.8  /  TBili  0.2  /  DBili  x   /  AST  18  /  ALT  18  /  AlkPhos  83  07-05    Creatinine Trend: 1.23<--, 0.87<--    COAGS:       proBNP:   Lipid Profile:   HgA1c:   TSH:       PHYSICAL EXAM:  T(C): 36.5 (07-06-23 @ 12:12), Max: 36.6 (07-06-23 @ 04:40)  HR: 72 (07-06-23 @ 12:12) (65 - 72)  BP: 123/84 (07-06-23 @ 12:12) (111/68 - 125/60)  RR: 18 (07-06-23 @ 12:12) (18 - 18)  SpO2: 96% (07-06-23 @ 12:12) (96% - 96%)  Wt(kg): --  I&O's Summary    05 Jul 2023 07:01  -  06 Jul 2023 07:00  --------------------------------------------------------  IN: 1298.5 mL / OUT: 0 mL / NET: 1298.5 mL      Gen: Appears well in NAD  HEENT:  (-)icterus (-)pallor  CV: N S1 S2 1/6 ASHLYN (+)2 Pulses B/l  Resp:  Clear to auscultation B/L, normal effort  GI: (+) BS Soft, NT, ND  Lymph:  (-)Edema, (-)obvious lymphadenopathy  Skin: Warm to touch, Normal turgor  Psych: Appropriate mood and affect      TELEMETRY: SR 60-90, PVCs 	    ECG: SR, narrow QRS, PVC  	    < from: TTE W or WO Ultrasound Enhancing Agent (07.05.23 @ 09:05) >  CONCLUSIONS:      1. Normal left ventricular cavity size. The left ventricular systolic function is normal.There are no regional wall motion abnormalities seen.   2. There is mild (grade 1) left ventricular diastolic dysfunction.   3. Normal right ventricular cavity size and normal right ventricular systolic function.   4. The left atrium is moderately dilated.   5. No prior echocardiogram is available for comparison.    < end of copied text >    < from: CT Angio Heart and Coronaries w/ IV Cont (07.05.23 @ 15:28) >  IMPRESSION:  1. Tortuous coronary arteries. R-PDA is not well seen. Rest of the   visualized epicardial coronary arteries have no plaque. Mid LAD   myocardial bridge.    2. Agatston calcium score of 0.    3. Possible atrial septal aneurysm, recommend correlation with   echocardiogram as clinically indicated.    4. Increased left ventricular wall thickness; left ventricular crypt,   consider further assessment with echocardiogram or cardiac MRI.    Findings discussed with Dr. Callaway 7/5/2023 5:28PM.    --- End of Report ---    < end of copied text >      ASSESSMENT/PLAN: Patient is a 57 y/o female well known to our office with PMH of PAF on Xarelto and HTN who is admitted with chest pressure and episodes of AMS. Cardiology consulted for further evaluation.     #Chest Pain  - MI ruled out  - Patient with normal cors on cath in 2014. Normal stress echo in office 5/2020.  - TTE with normal LV/RV function, no wall motion abnormalities  - CT Cors with no CAD. Poss atrial septal aneurysm/LV crypt however not seen on echo and no further imaging needed    #AMS  - Resolved, Unclear etiology  - CT Head negative  - TSH WNL  - Tele with no sig events  - Neuro consult appreciated - outpatient EEG and open MRI    #PAF  - Continue Xarelto for stroke prevention  - Toprol XL 50mg daily to prevent AF RVR  - EP consult appreciated - outpatient [facility]    #HTN  - Continue Amlodipine, HCTZ, Valsartan, and Toprol XL    - No further inpatient cardiac w/u planned - stable for discharge from CV perspective  - Patient to f/u with Dr. Rangel after discharge    Taiwo Cee PA-C  Pager: [phone]

## 2023-07-06 NOTE — PROGRESS NOTE ADULT - SUBJECTIVE AND OBJECTIVE BOX
EP     DATE OF SERVICE: 07-06-23    Patient reports no further AMS episodes. denies chest pain, palpitations, or shortness of breath.   Review of systems otherwise negative.  Echo and CTA reassuring. Awaiting a shower this morning.  	  acetaminophen     Tablet .. 650 milliGRAM(s) Oral every 6 hours PRN  aluminum hydroxide/magnesium hydroxide/simethicone Suspension 30 milliLiter(s) Oral every 4 hours PRN  amLODIPine   Tablet 10 milliGRAM(s) Oral daily  hydrochlorothiazide 25 milliGRAM(s) Oral daily  melatonin 3 milliGRAM(s) Oral at bedtime PRN  metoprolol tartrate 25 milliGRAM(s) Oral two times a day  ondansetron Injectable 4 milliGRAM(s) IV Push every 8 hours PRN  rivaroxaban 20 milliGRAM(s) Oral with dinner  sodium chloride 0.9%. 1000 milliLiter(s) IV Continuous <Continuous>  valsartan 320 milliGRAM(s) Oral daily                            11.3   6.52  )-----------( 271      ( 05 Jul 2023 07:15 )             37.1       07-05    141  |  104  |  25<H>  ----------------------------<  80  3.9   |  23  |  1.23    Ca    8.9      05 Jul 2023 07:15    TPro  7.3  /  Alb  3.8  /  TBili  0.2  /  DBili  x   /  AST  18  /  ALT  18  /  AlkPhos  83  07-05      CARDIAC MARKERS ( 04 Jul 2023 13:29 )  x     / x     / 117 U/L / x     / 2.0 ng/mL      T(C): 36.6 (07-06-23 @ 04:40), Max: 36.6 (07-06-23 @ 04:40)  HR: 65 (07-06-23 @ 04:40) (65 - 74)  BP: 125/60 (07-06-23 @ 04:40) (111/68 - 125/60)  RR: 18 (07-06-23 @ 04:40) (18 - 18)  SpO2: 96% (07-06-23 @ 04:40) (95% - 96%)  Wt(kg): --    I&O's Summary    05 Jul 2023 07:01  -  06 Jul 2023 07:00  --------------------------------------------------------  IN: 1298.5 mL / OUT: 0 mL / NET: 1298.5 mL    Gen: NAD  HEENT:  (-)icterus (-)pallor  CV: N S1 S2 1/6 ASHLYN (+)2 Pulses B/l  Resp:  Clear to auscultation B/L, normal effort  GI: (+) BS Soft, NT, ND  Lymph:  (-)Edema, (-)obvious lymphadenopathy  Skin: Warm to touch, Normal turgor  Psych: Appropriate mood and affect      TELEMETRY: SB/SR 50-70s	    ECG: NSR 	    ASSESSMENT/PLAN: Ms Duffy is a pleasant 58y Female with PMH of PAF and HTN here with palpitations, chest discomfort and lightheadedness.  Ruled out for acute MI with negative troponin x 2.  Maintain cardiac telemetry.  Echo reassuring with normal LVEF%.  CTA reassuring with angiographically near-normal coronaries.  OK to resume Xarelto at home dose.  No further EP testing planned in hospital.  Outpatient [facility] for bradycardia surveillance.  No indication for permanent pacing at this time.    Peter Brooks M.D.  Cardiac Electrophysiology  [phone]

## 2023-07-06 NOTE — CONSULT NOTE ADULT - ASSESSMENT
57yo Female with CTS, anemia, B12 deficiency, lumbar disc disease,  HTN, HLD, AF, presented to the ED with a chief complaint chest discomfort / pressure.  neuro called from episode of brain fog unable to identify train at her job.  similar episode in 2021.   no LOC, no tongue bite. no convulsions, no incontinence. was initially thought to be 2/2 cardiac arrhythmia. neuro called for furhter eval    Impression:   transient episodes of brain fog without LOC, incontinence or convulsions   - MRI brain but she is claustrophobic, prefers open MRI outpatient   - EEG outpatient   - xarelto for AF   - cardiac workup in progress   - telemetry  - check FS, glucose control <180  - GI/DVT ppx  - Counseling on diet, exercise, and medication adherence was done  - Counseling on smoking cessation and alcohol consumption offered when appropriate.  - Pain assessed and judicious use of narcotics when appropriate was discussed.    - Stroke education given when appropriate.  - Importance of fall prevention discussed.   - Differential diagnosis and plan of care discussed with patient and/or family and primary team  - Thank you for allowing me to participate in the care of this patient. Call with questions.    - no neuro objection to d/c planning   Evelio Yates MD  Vascular Neurology  Office: [phone] 
Agree with above assessment and plan as outlined above.    - echo  - EP eval for PAF    Beck Callaway MD, [facility]  BEEPER [phone]

## 2023-08-29 ENCOUNTER — APPOINTMENT (OUTPATIENT)
Age: 58
End: 2023-08-29
Payer: COMMERCIAL

## 2023-08-29 PROCEDURE — PIP: CUSTOM

## 2024-05-09 ENCOUNTER — APPOINTMENT (OUTPATIENT)
Age: 59
End: 2024-05-09
Payer: COMMERCIAL

## 2024-05-09 PROCEDURE — D0220: CPT

## 2024-05-09 PROCEDURE — D0120: CPT

## 2024-05-09 PROCEDURE — D0272: CPT

## 2024-05-09 PROCEDURE — D1110 PROPHYLAXIS - ADULT: CPT

## 2024-08-16 ENCOUNTER — APPOINTMENT (OUTPATIENT)
Age: 59
End: 2024-08-16

## 2024-08-16 PROCEDURE — D6011: CPT | Mod: NC

## 2024-09-18 ENCOUNTER — APPOINTMENT (OUTPATIENT)
Age: 59
End: 2024-09-18
Payer: COMMERCIAL

## 2024-09-18 PROCEDURE — NTX: CUSTOM

## 2024-09-26 ENCOUNTER — APPOINTMENT (OUTPATIENT)
Age: 59
End: 2024-09-26
Payer: COMMERCIAL

## 2024-09-26 PROCEDURE — D2391: CPT

## 2024-10-03 ENCOUNTER — APPOINTMENT (OUTPATIENT)
Age: 59
End: 2024-10-03
Payer: COMMERCIAL

## 2024-10-03 PROCEDURE — D6065: CPT

## 2024-10-17 ENCOUNTER — APPOINTMENT (OUTPATIENT)
Age: 59
End: 2024-10-17
Payer: COMMERCIAL

## 2024-10-17 PROCEDURE — INCR: CUSTOM

## 2024-11-08 ENCOUNTER — APPOINTMENT (OUTPATIENT)
Age: 59
End: 2024-11-08
Payer: COMMERCIAL

## 2024-11-08 PROCEDURE — D0171: CPT | Mod: NC

## 2024-11-14 ENCOUNTER — APPOINTMENT (OUTPATIENT)
Age: 59
End: 2024-11-14
Payer: COMMERCIAL

## 2024-11-14 PROCEDURE — PIP: CUSTOM

## 2024-12-09 ENCOUNTER — APPOINTMENT (OUTPATIENT)
Age: 59
End: 2024-12-09
Payer: COMMERCIAL

## 2024-12-09 PROCEDURE — EIP: CUSTOM

## 2025-01-06 ENCOUNTER — APPOINTMENT (OUTPATIENT)
Age: 60
End: 2025-01-06
Payer: COMMERCIAL

## 2025-01-06 PROCEDURE — RCC: CUSTOM

## 2025-01-06 PROCEDURE — D3310: CPT

## 2025-01-09 ENCOUNTER — APPOINTMENT (OUTPATIENT)
Age: 60
End: 2025-01-09
Payer: COMMERCIAL

## 2025-01-09 PROCEDURE — D2330: CPT

## 2025-01-30 ENCOUNTER — APPOINTMENT (OUTPATIENT)
Age: 60
End: 2025-01-30
Payer: COMMERCIAL

## 2025-01-30 PROCEDURE — D2331: CPT

## 2025-02-06 ENCOUNTER — APPOINTMENT (OUTPATIENT)
Age: 60
End: 2025-02-06
Payer: COMMERCIAL

## 2025-02-06 PROCEDURE — D0382: CPT

## 2025-02-28 ENCOUNTER — APPOINTMENT (OUTPATIENT)
Age: 60
End: 2025-02-28

## 2025-02-28 PROCEDURE — D6010: CPT

## 2025-03-17 ENCOUNTER — APPOINTMENT (OUTPATIENT)
Age: 60
End: 2025-03-17
Payer: COMMERCIAL

## 2025-03-17 PROCEDURE — D0171: CPT

## 2025-04-03 ENCOUNTER — APPOINTMENT (OUTPATIENT)
Age: 60
End: 2025-04-03

## 2025-04-07 ENCOUNTER — APPOINTMENT (OUTPATIENT)
Age: 60
End: 2025-04-07

## 2025-04-07 PROCEDURE — D0171: CPT | Mod: NC

## 2025-05-06 ENCOUNTER — NON-APPOINTMENT (OUTPATIENT)
Age: 60
End: 2025-05-06

## 2025-07-28 ENCOUNTER — APPOINTMENT (OUTPATIENT)
Age: 60
End: 2025-07-28
Payer: COMMERCIAL

## 2025-07-28 PROCEDURE — D6065: CPT

## 2025-08-25 ENCOUNTER — APPOINTMENT (OUTPATIENT)
Age: 60
End: 2025-08-25
Payer: COMMERCIAL

## 2025-08-25 PROCEDURE — PIP: CUSTOM

## 2025-09-15 ENCOUNTER — APPOINTMENT (OUTPATIENT)
Age: 60
End: 2025-09-15
Payer: COMMERCIAL

## 2025-09-15 PROCEDURE — PIP: CUSTOM
